# Patient Record
Sex: MALE | Race: BLACK OR AFRICAN AMERICAN | NOT HISPANIC OR LATINO | URBAN - METROPOLITAN AREA
[De-identification: names, ages, dates, MRNs, and addresses within clinical notes are randomized per-mention and may not be internally consistent; named-entity substitution may affect disease eponyms.]

---

## 2018-11-01 ENCOUNTER — EMERGENCY (EMERGENCY)
Facility: HOSPITAL | Age: 48
LOS: 1 days | Discharge: HOME | End: 2018-11-01
Attending: EMERGENCY MEDICINE | Admitting: EMERGENCY MEDICINE
Payer: COMMERCIAL

## 2018-11-01 VITALS
SYSTOLIC BLOOD PRESSURE: 159 MMHG | TEMPERATURE: 97 F | DIASTOLIC BLOOD PRESSURE: 82 MMHG | RESPIRATION RATE: 17 BRPM | OXYGEN SATURATION: 100 % | HEART RATE: 79 BPM

## 2018-11-01 VITALS
RESPIRATION RATE: 16 BRPM | DIASTOLIC BLOOD PRESSURE: 90 MMHG | TEMPERATURE: 99 F | HEART RATE: 56 BPM | OXYGEN SATURATION: 98 % | SYSTOLIC BLOOD PRESSURE: 168 MMHG

## 2018-11-01 DIAGNOSIS — Z98.890 OTHER SPECIFIED POSTPROCEDURAL STATES: Chronic | ICD-10-CM

## 2018-11-01 LAB
ALBUMIN SERPL ELPH-MCNC: 4.5 G/DL — SIGNIFICANT CHANGE UP (ref 3.3–5)
ALP SERPL-CCNC: 65 U/L — SIGNIFICANT CHANGE UP (ref 40–120)
ALT FLD-CCNC: 21 U/L — SIGNIFICANT CHANGE UP (ref 4–41)
APTT BLD: 30.8 SEC — SIGNIFICANT CHANGE UP (ref 27.5–36.3)
AST SERPL-CCNC: 28 U/L — SIGNIFICANT CHANGE UP (ref 4–40)
BASOPHILS # BLD AUTO: 0.06 K/UL — SIGNIFICANT CHANGE UP (ref 0–0.2)
BASOPHILS NFR BLD AUTO: 0.5 % — SIGNIFICANT CHANGE UP (ref 0–2)
BILIRUB SERPL-MCNC: 0.3 MG/DL — SIGNIFICANT CHANGE UP (ref 0.2–1.2)
BLD GP AB SCN SERPL QL: NEGATIVE — SIGNIFICANT CHANGE UP
BUN SERPL-MCNC: 16 MG/DL — SIGNIFICANT CHANGE UP (ref 7–23)
CALCIUM SERPL-MCNC: 9.7 MG/DL — SIGNIFICANT CHANGE UP (ref 8.4–10.5)
CHLORIDE SERPL-SCNC: 98 MMOL/L — SIGNIFICANT CHANGE UP (ref 98–107)
CK MB BLD-MCNC: 1.2 — SIGNIFICANT CHANGE UP (ref 0–2.5)
CK MB BLD-MCNC: 3.23 NG/ML — SIGNIFICANT CHANGE UP (ref 1–6.6)
CK SERPL-CCNC: 264 U/L — HIGH (ref 30–200)
CO2 SERPL-SCNC: 28 MMOL/L — SIGNIFICANT CHANGE UP (ref 22–31)
CREAT SERPL-MCNC: 1.35 MG/DL — HIGH (ref 0.5–1.3)
EOSINOPHIL # BLD AUTO: 0.18 K/UL — SIGNIFICANT CHANGE UP (ref 0–0.5)
EOSINOPHIL NFR BLD AUTO: 1.5 % — SIGNIFICANT CHANGE UP (ref 0–6)
GLUCOSE SERPL-MCNC: 140 MG/DL — HIGH (ref 70–99)
HCT VFR BLD CALC: 44.6 % — SIGNIFICANT CHANGE UP (ref 39–50)
HGB BLD-MCNC: 14.2 G/DL — SIGNIFICANT CHANGE UP (ref 13–17)
IMM GRANULOCYTES # BLD AUTO: 0.03 # — SIGNIFICANT CHANGE UP
IMM GRANULOCYTES NFR BLD AUTO: 0.3 % — SIGNIFICANT CHANGE UP (ref 0–1.5)
INR BLD: 0.97 — SIGNIFICANT CHANGE UP (ref 0.88–1.17)
LYMPHOCYTES # BLD AUTO: 3.93 K/UL — HIGH (ref 1–3.3)
LYMPHOCYTES # BLD AUTO: 33.2 % — SIGNIFICANT CHANGE UP (ref 13–44)
MAGNESIUM SERPL-MCNC: 1.9 MG/DL — SIGNIFICANT CHANGE UP (ref 1.6–2.6)
MCHC RBC-ENTMCNC: 27.5 PG — SIGNIFICANT CHANGE UP (ref 27–34)
MCHC RBC-ENTMCNC: 31.8 % — LOW (ref 32–36)
MCV RBC AUTO: 86.3 FL — SIGNIFICANT CHANGE UP (ref 80–100)
MONOCYTES # BLD AUTO: 0.73 K/UL — SIGNIFICANT CHANGE UP (ref 0–0.9)
MONOCYTES NFR BLD AUTO: 6.2 % — SIGNIFICANT CHANGE UP (ref 2–14)
NEUTROPHILS # BLD AUTO: 6.89 K/UL — SIGNIFICANT CHANGE UP (ref 1.8–7.4)
NEUTROPHILS NFR BLD AUTO: 58.3 % — SIGNIFICANT CHANGE UP (ref 43–77)
NRBC # FLD: 0 — SIGNIFICANT CHANGE UP
NT-PROBNP SERPL-SCNC: 41.76 PG/ML — SIGNIFICANT CHANGE UP
PHOSPHATE SERPL-MCNC: 3.6 MG/DL — SIGNIFICANT CHANGE UP (ref 2.5–4.5)
PLATELET # BLD AUTO: 210 K/UL — SIGNIFICANT CHANGE UP (ref 150–400)
PMV BLD: 11.1 FL — SIGNIFICANT CHANGE UP (ref 7–13)
POTASSIUM SERPL-MCNC: 4.3 MMOL/L — SIGNIFICANT CHANGE UP (ref 3.5–5.3)
POTASSIUM SERPL-SCNC: 4.3 MMOL/L — SIGNIFICANT CHANGE UP (ref 3.5–5.3)
PROT SERPL-MCNC: 8 G/DL — SIGNIFICANT CHANGE UP (ref 6–8.3)
PROTHROM AB SERPL-ACNC: 11 SEC — SIGNIFICANT CHANGE UP (ref 9.8–13.1)
RBC # BLD: 5.17 M/UL — SIGNIFICANT CHANGE UP (ref 4.2–5.8)
RBC # FLD: 13.7 % — SIGNIFICANT CHANGE UP (ref 10.3–14.5)
RH IG SCN BLD-IMP: POSITIVE — SIGNIFICANT CHANGE UP
SODIUM SERPL-SCNC: 141 MMOL/L — SIGNIFICANT CHANGE UP (ref 135–145)
TROPONIN T, HIGH SENSITIVITY: 10 NG/L — SIGNIFICANT CHANGE UP (ref ?–14)
TROPONIN T, HIGH SENSITIVITY: 10 NG/L — SIGNIFICANT CHANGE UP (ref ?–14)
TSH SERPL-MCNC: 1.35 UIU/ML — SIGNIFICANT CHANGE UP (ref 0.27–4.2)
WBC # BLD: 11.82 K/UL — HIGH (ref 3.8–10.5)
WBC # FLD AUTO: 11.82 K/UL — HIGH (ref 3.8–10.5)

## 2018-11-01 PROCEDURE — 99220: CPT

## 2018-11-01 PROCEDURE — 71045 X-RAY EXAM CHEST 1 VIEW: CPT | Mod: 26

## 2018-11-01 PROCEDURE — 93306 TTE W/DOPPLER COMPLETE: CPT | Mod: 26

## 2018-11-01 RX ORDER — ASPIRIN/CALCIUM CARB/MAGNESIUM 324 MG
324 TABLET ORAL ONCE
Qty: 0 | Refills: 0 | Status: COMPLETED | OUTPATIENT
Start: 2018-11-01 | End: 2018-11-01

## 2018-11-01 RX ORDER — LIDOCAINE 4 G/100G
10 CREAM TOPICAL ONCE
Qty: 0 | Refills: 0 | Status: COMPLETED | OUTPATIENT
Start: 2018-11-01 | End: 2018-11-01

## 2018-11-01 RX ORDER — FAMOTIDINE 10 MG/ML
20 INJECTION INTRAVENOUS ONCE
Qty: 0 | Refills: 0 | Status: COMPLETED | OUTPATIENT
Start: 2018-11-01 | End: 2018-11-01

## 2018-11-01 RX ADMIN — FAMOTIDINE 20 MILLIGRAM(S): 10 INJECTION INTRAVENOUS at 12:28

## 2018-11-01 RX ADMIN — Medication 324 MILLIGRAM(S): at 04:48

## 2018-11-01 RX ADMIN — LIDOCAINE 10 MILLILITER(S): 4 CREAM TOPICAL at 03:59

## 2018-11-01 NOTE — ED CDU PROVIDER INITIAL DAY NOTE - MEDICAL DECISION MAKING DETAILS
pt with incidental finding of STEMI on EKG, with clinical picture of no cp, dyspnea, diaphoresis, n/v. pt pending echo to r/o LV aneurysm.
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
## 2018-11-01 NOTE — ED ADULT NURSE REASSESSMENT NOTE - NS ED NURSE REASSESS COMMENT FT1
Patient sleeping, appears comfortable and in no apparent distress.  Denies chest pain, SOB or dizziness at this time.  Vitals taken and will continue to monitor patient.
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
## 2018-11-01 NOTE — ED PROVIDER NOTE - CARE PLAN
Principal Discharge DX:	Throat pain  Assessment and plan of treatment:	The patient was given verbal and written discharge instructions. Specifically, instructions when to return to the ED and when to seek follow-up from their pcp was discussed. Any specialty follow-up was discussed, including how to make an appointment.  Instructions were discussed in simple, plain language and was understood by the patient. The patient understands that should their symptoms worsen or any new symptoms arise, they should return to the ED immediately for further evaluation. Principal Discharge DX:	ST elevation myocardial infarction (STEMI), unspecified artery  Assessment and plan of treatment:	The patient was given verbal and written discharge instructions. Specifically, instructions when to return to the ED and when to seek follow-up from their pcp was discussed. Any specialty follow-up was discussed, including how to make an appointment.  Instructions were discussed in simple, plain language and was understood by the patient. The patient understands that should their symptoms worsen or any new symptoms arise, they should return to the ED immediately for further evaluation.
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
## 2018-11-01 NOTE — ED ADULT NURSE NOTE - OBJECTIVE STATEMENT
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

## 2018-11-01 NOTE — ED CDU PROVIDER INITIAL DAY NOTE - PROGRESS NOTE DETAILS
DEISY Curiel: pt resting comfortably in bed NAD, no episodes of chest pain while in the CDU.  Pt seen and cleared by cardiology Dr Kirkpatrick. Pt feels well denies any complaints ambulating without difficulty.  Results reviewed with patient.  Discharge reviewed and discussed with patient.

## 2018-11-01 NOTE — ED ADULT NURSE NOTE - NSIMPLEMENTINTERV_GEN_ALL_ED
Implemented All Universal Safety Interventions:  Raphine to call system. Call bell, personal items and telephone within reach. Instruct patient to call for assistance. Room bathroom lighting operational. Non-slip footwear when patient is off stretcher. Physically safe environment: no spills, clutter or unnecessary equipment. Stretcher in lowest position, wheels locked, appropriate side rails in place.
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
## 2018-11-01 NOTE — ED PROVIDER NOTE - ENMT, MLM
Airway patent, Nasal mucosa clear. Mouth with normal mucosa. Throat has no vesicles, no oropharyngeal exudates and uvula is midline. cshaped abrasion to superior posterior palate with surrounding mild ulcer
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
## 2018-11-01 NOTE — ED CDU PROVIDER INITIAL DAY NOTE - FAMILY HISTORY
No pertinent family history in first degree relatives Mother  Still living? Unknown  Family history of diabetes mellitus (DM), Age at diagnosis: Age Unknown

## 2018-11-01 NOTE — ED PROVIDER NOTE - PHYSICAL EXAMINATION
Oumou att: nad, aaox3, throat roof of mouth cut and 1 ulcer, ctabl, rrr, s1s2, abd soft ntnd, neg le edema

## 2018-11-01 NOTE — ED PROVIDER NOTE - OBJECTIVE STATEMENT
48yM hx htn, hld, dm p/w possible inhalation injury. Pt was cleaning the [facility] pharmacy with a solution that smelled like vinegar when he started to feel burning in the back of his throat followed by sensation 48yM hx htn, hld, dm p/w possible inhalation injury. Pt was cleaning the [employer] pharmacy with a solution that smelled like vinegar when he started to feel burning in the back of his throat followed by sensation of sob and lightheadedness. Symptoms resolved as soon as pt left room. He is now assymptomatic asid for mild burning in back of throat. 48yM hx htn, hld, dm p/w possible inhalation injury. Pt was cleaning the [employer] pharmacy with a solution that smelled like vinegar when he started to feel burning in the back of his throat followed by sensation of sob and lightheadedness. Symptoms resolved as soon as pt left room. He is now assymptomatic asid for mild burning in back of throat.  03:50 [place] att: 48M h/o htn, hld, pre-dm c/o dyspnea s/p workplace inhalation. This evening patient an [employer] employee was cleaning out "[employer]s pharmacy when he smelt a vinegar like scent and felt sudden dyspnea that resolved when he removed himself from the area. Denies cp at any time. Denies dyspnea at present. Mild sore throat without fever or chillls. Patient has no h/o asthma, copd, or smoking. Patient's supervisor in ER reports patient was cleaning with 0.5% hydrogen peroxide.

## 2020-04-26 ENCOUNTER — MESSAGE (OUTPATIENT)
Age: 50
End: 2020-04-26

## 2020-05-06 LAB
SARS-COV-2 IGG SERPL IA-ACNC: 11.3 RATIO
SARS-COV-2 IGG SERPL QL IA: POSITIVE

## 2023-03-03 ENCOUNTER — TRANSCRIPTION ENCOUNTER (OUTPATIENT)
Age: 53
End: 2023-03-03

## 2023-03-03 ENCOUNTER — INPATIENT (INPATIENT)
Facility: HOSPITAL | Age: 53
LOS: 2 days | Discharge: HOME | End: 2023-03-06
Attending: INTERNAL MEDICINE | Admitting: INTERNAL MEDICINE
Payer: COMMERCIAL

## 2023-03-03 VITALS
DIASTOLIC BLOOD PRESSURE: 114 MMHG | SYSTOLIC BLOOD PRESSURE: 201 MMHG | RESPIRATION RATE: 20 BRPM | HEART RATE: 70 BPM | TEMPERATURE: 98 F | OXYGEN SATURATION: 100 %

## 2023-03-03 DIAGNOSIS — R07.9 CHEST PAIN, UNSPECIFIED: ICD-10-CM

## 2023-03-03 DIAGNOSIS — I20.9 ANGINA PECTORIS, UNSPECIFIED: ICD-10-CM

## 2023-03-03 DIAGNOSIS — Z98.890 OTHER SPECIFIED POSTPROCEDURAL STATES: Chronic | ICD-10-CM

## 2023-03-03 DIAGNOSIS — I10 ESSENTIAL (PRIMARY) HYPERTENSION: ICD-10-CM

## 2023-03-03 DIAGNOSIS — I25.10 ATHEROSCLEROTIC HEART DISEASE OF NATIVE CORONARY ARTERY WITHOUT ANGINA PECTORIS: ICD-10-CM

## 2023-03-03 DIAGNOSIS — E11.9 TYPE 2 DIABETES MELLITUS WITHOUT COMPLICATIONS: ICD-10-CM

## 2023-03-03 DIAGNOSIS — E87.20 ACIDOSIS, UNSPECIFIED: ICD-10-CM

## 2023-03-03 DIAGNOSIS — E78.5 HYPERLIPIDEMIA, UNSPECIFIED: ICD-10-CM

## 2023-03-03 DIAGNOSIS — R63.8 OTHER SYMPTOMS AND SIGNS CONCERNING FOOD AND FLUID INTAKE: ICD-10-CM

## 2023-03-03 LAB
A1C WITH ESTIMATED AVERAGE GLUCOSE RESULT: 11 % — HIGH (ref 4–5.6)
ALBUMIN SERPL ELPH-MCNC: 4.2 G/DL — SIGNIFICANT CHANGE UP (ref 3.3–5)
ALP SERPL-CCNC: 85 U/L — SIGNIFICANT CHANGE UP (ref 40–120)
ALT FLD-CCNC: 39 U/L — SIGNIFICANT CHANGE UP (ref 4–41)
ANION GAP SERPL CALC-SCNC: 12 MMOL/L — SIGNIFICANT CHANGE UP (ref 7–14)
ANISOCYTOSIS BLD QL: SLIGHT — SIGNIFICANT CHANGE UP
APTT BLD: 30.6 SEC — SIGNIFICANT CHANGE UP (ref 27–36.3)
AST SERPL-CCNC: 24 U/L — SIGNIFICANT CHANGE UP (ref 4–40)
BASE EXCESS BLDV CALC-SCNC: 0.7 MMOL/L — SIGNIFICANT CHANGE UP (ref -2–3)
BASOPHILS # BLD AUTO: 0.09 K/UL — SIGNIFICANT CHANGE UP (ref 0–0.2)
BASOPHILS NFR BLD AUTO: 0.9 % — SIGNIFICANT CHANGE UP (ref 0–2)
BILIRUB SERPL-MCNC: 0.3 MG/DL — SIGNIFICANT CHANGE UP (ref 0.2–1.2)
BLOOD GAS VENOUS COMPREHENSIVE RESULT: SIGNIFICANT CHANGE UP
BUN SERPL-MCNC: 10 MG/DL — SIGNIFICANT CHANGE UP (ref 7–23)
CALCIUM SERPL-MCNC: 9.1 MG/DL — SIGNIFICANT CHANGE UP (ref 8.4–10.5)
CHLORIDE BLDV-SCNC: 100 MMOL/L — SIGNIFICANT CHANGE UP (ref 96–108)
CHLORIDE SERPL-SCNC: 102 MMOL/L — SIGNIFICANT CHANGE UP (ref 98–107)
CHOLEST SERPL-MCNC: 162 MG/DL — SIGNIFICANT CHANGE UP
CK MB CFR SERPL CALC: 1.6 NG/ML — SIGNIFICANT CHANGE UP
CO2 BLDV-SCNC: 29.1 MMOL/L — HIGH (ref 22–26)
CO2 SERPL-SCNC: 25 MMOL/L — SIGNIFICANT CHANGE UP (ref 22–31)
CREAT SERPL-MCNC: 0.96 MG/DL — SIGNIFICANT CHANGE UP (ref 0.5–1.3)
EGFR: 95 ML/MIN/1.73M2 — SIGNIFICANT CHANGE UP
EOSINOPHIL # BLD AUTO: 0.09 K/UL — SIGNIFICANT CHANGE UP (ref 0–0.5)
EOSINOPHIL NFR BLD AUTO: 0.9 % — SIGNIFICANT CHANGE UP (ref 0–6)
ESTIMATED AVERAGE GLUCOSE: 269 — SIGNIFICANT CHANGE UP
FLUAV AG NPH QL: SIGNIFICANT CHANGE UP
FLUBV AG NPH QL: SIGNIFICANT CHANGE UP
GAS PNL BLDV: 134 MMOL/L — LOW (ref 136–145)
GIANT PLATELETS BLD QL SMEAR: PRESENT — SIGNIFICANT CHANGE UP
GLUCOSE BLDC GLUCOMTR-MCNC: 170 MG/DL — HIGH (ref 70–99)
GLUCOSE BLDC GLUCOMTR-MCNC: 176 MG/DL — HIGH (ref 70–99)
GLUCOSE BLDC GLUCOMTR-MCNC: 176 MG/DL — HIGH (ref 70–99)
GLUCOSE BLDC GLUCOMTR-MCNC: 181 MG/DL — HIGH (ref 70–99)
GLUCOSE BLDC GLUCOMTR-MCNC: 201 MG/DL — HIGH (ref 70–99)
GLUCOSE BLDV-MCNC: 232 MG/DL — HIGH (ref 70–99)
GLUCOSE SERPL-MCNC: 224 MG/DL — HIGH (ref 70–99)
HCO3 BLDV-SCNC: 28 MMOL/L — SIGNIFICANT CHANGE UP (ref 22–29)
HCT VFR BLD CALC: 47.6 % — SIGNIFICANT CHANGE UP (ref 39–50)
HCT VFR BLDA CALC: 47 % — SIGNIFICANT CHANGE UP (ref 39–51)
HDLC SERPL-MCNC: 37 MG/DL — LOW
HGB BLD CALC-MCNC: 15.8 G/DL — SIGNIFICANT CHANGE UP (ref 12.6–17.4)
HGB BLD-MCNC: 14.8 G/DL — SIGNIFICANT CHANGE UP (ref 13–17)
IANC: 3.85 K/UL — SIGNIFICANT CHANGE UP (ref 1.8–7.4)
INR BLD: 1.08 RATIO — SIGNIFICANT CHANGE UP (ref 0.88–1.16)
LACTATE BLDV-MCNC: 2.1 MMOL/L — HIGH (ref 0.5–2)
LIPID PNL WITH DIRECT LDL SERPL: 100 MG/DL — HIGH
LYMPHOCYTES # BLD AUTO: 2.53 K/UL — SIGNIFICANT CHANGE UP (ref 1–3.3)
LYMPHOCYTES # BLD AUTO: 24.1 % — SIGNIFICANT CHANGE UP (ref 13–44)
MANUAL SMEAR VERIFICATION: SIGNIFICANT CHANGE UP
MCHC RBC-ENTMCNC: 26 PG — LOW (ref 27–34)
MCHC RBC-ENTMCNC: 31.1 GM/DL — LOW (ref 32–36)
MCV RBC AUTO: 83.7 FL — SIGNIFICANT CHANGE UP (ref 80–100)
MONOCYTES # BLD AUTO: 0.93 K/UL — HIGH (ref 0–0.9)
MONOCYTES NFR BLD AUTO: 8.9 % — SIGNIFICANT CHANGE UP (ref 2–14)
NEUTROPHILS # BLD AUTO: 4.12 K/UL — SIGNIFICANT CHANGE UP (ref 1.8–7.4)
NEUTROPHILS NFR BLD AUTO: 37.5 % — LOW (ref 43–77)
NEUTS BAND # BLD: 1.8 % — SIGNIFICANT CHANGE UP (ref 0–6)
NON HDL CHOLESTEROL: 125 MG/DL — SIGNIFICANT CHANGE UP
NRBC # BLD: 1 /100 — HIGH (ref 0–0)
NT-PROBNP SERPL-SCNC: 54 PG/ML — SIGNIFICANT CHANGE UP
PCO2 BLDV: 51 MMHG — SIGNIFICANT CHANGE UP (ref 42–55)
PH BLDV: 7.34 — SIGNIFICANT CHANGE UP (ref 7.32–7.43)
PLAT MORPH BLD: NORMAL — SIGNIFICANT CHANGE UP
PLATELET # BLD AUTO: 218 K/UL — SIGNIFICANT CHANGE UP (ref 150–400)
PLATELET COUNT - ESTIMATE: NORMAL — SIGNIFICANT CHANGE UP
PO2 BLDV: 36 MMHG — SIGNIFICANT CHANGE UP (ref 25–45)
POIKILOCYTOSIS BLD QL AUTO: SLIGHT — SIGNIFICANT CHANGE UP
POLYCHROMASIA BLD QL SMEAR: SLIGHT — SIGNIFICANT CHANGE UP
POTASSIUM BLDV-SCNC: 4.7 MMOL/L — SIGNIFICANT CHANGE UP (ref 3.5–5.1)
POTASSIUM SERPL-MCNC: 3.4 MMOL/L — LOW (ref 3.5–5.3)
POTASSIUM SERPL-SCNC: 3.4 MMOL/L — LOW (ref 3.5–5.3)
PROT SERPL-MCNC: 7.5 G/DL — SIGNIFICANT CHANGE UP (ref 6–8.3)
PROTHROM AB SERPL-ACNC: 12.5 SEC — SIGNIFICANT CHANGE UP (ref 10.5–13.4)
RBC # BLD: 5.69 M/UL — SIGNIFICANT CHANGE UP (ref 4.2–5.8)
RBC # FLD: 13.2 % — SIGNIFICANT CHANGE UP (ref 10.3–14.5)
RBC BLD AUTO: ABNORMAL
RSV RNA NPH QL NAA+NON-PROBE: SIGNIFICANT CHANGE UP
SAO2 % BLDV: 61.3 % — LOW (ref 67–88)
SARS-COV-2 RNA SPEC QL NAA+PROBE: SIGNIFICANT CHANGE UP
SMUDGE CELLS # BLD: PRESENT — SIGNIFICANT CHANGE UP
SODIUM SERPL-SCNC: 139 MMOL/L — SIGNIFICANT CHANGE UP (ref 135–145)
TRIGL SERPL-MCNC: 125 MG/DL — SIGNIFICANT CHANGE UP
TROPONIN T, HIGH SENSITIVITY RESULT: 14 NG/L — SIGNIFICANT CHANGE UP
TROPONIN T, HIGH SENSITIVITY RESULT: 17 NG/L — SIGNIFICANT CHANGE UP
TSH SERPL-MCNC: 1.99 UIU/ML — SIGNIFICANT CHANGE UP (ref 0.27–4.2)
VARIANT LYMPHS # BLD: 25.9 % — HIGH (ref 0–6)
WBC # BLD: 10.48 K/UL — SIGNIFICANT CHANGE UP (ref 3.8–10.5)
WBC # FLD AUTO: 10.48 K/UL — SIGNIFICANT CHANGE UP (ref 3.8–10.5)

## 2023-03-03 PROCEDURE — 93018 CV STRESS TEST I&R ONLY: CPT | Mod: GC

## 2023-03-03 PROCEDURE — 93306 TTE W/DOPPLER COMPLETE: CPT | Mod: 26

## 2023-03-03 PROCEDURE — 99223 1ST HOSP IP/OBS HIGH 75: CPT

## 2023-03-03 PROCEDURE — 99254 IP/OBS CNSLTJ NEW/EST MOD 60: CPT

## 2023-03-03 PROCEDURE — 99285 EMERGENCY DEPT VISIT HI MDM: CPT

## 2023-03-03 PROCEDURE — 78452 HT MUSCLE IMAGE SPECT MULT: CPT | Mod: 26

## 2023-03-03 PROCEDURE — 93016 CV STRESS TEST SUPVJ ONLY: CPT | Mod: GC

## 2023-03-03 PROCEDURE — 71045 X-RAY EXAM CHEST 1 VIEW: CPT | Mod: 26

## 2023-03-03 RX ORDER — GLUCAGON INJECTION, SOLUTION 0.5 MG/.1ML
1 INJECTION, SOLUTION SUBCUTANEOUS ONCE
Refills: 0 | Status: DISCONTINUED | OUTPATIENT
Start: 2023-03-03 | End: 2023-03-06

## 2023-03-03 RX ORDER — DEXTROSE 50 % IN WATER 50 %
25 SYRINGE (ML) INTRAVENOUS ONCE
Refills: 0 | Status: DISCONTINUED | OUTPATIENT
Start: 2023-03-03 | End: 2023-03-06

## 2023-03-03 RX ORDER — NIFEDIPINE 30 MG
60 TABLET, EXTENDED RELEASE 24 HR ORAL DAILY
Refills: 0 | Status: DISCONTINUED | OUTPATIENT
Start: 2023-03-03 | End: 2023-03-06

## 2023-03-03 RX ORDER — DEXTROSE 50 % IN WATER 50 %
15 SYRINGE (ML) INTRAVENOUS ONCE
Refills: 0 | Status: DISCONTINUED | OUTPATIENT
Start: 2023-03-03 | End: 2023-03-06

## 2023-03-03 RX ORDER — LANOLIN ALCOHOL/MO/W.PET/CERES
3 CREAM (GRAM) TOPICAL AT BEDTIME
Refills: 0 | Status: DISCONTINUED | OUTPATIENT
Start: 2023-03-03 | End: 2023-03-06

## 2023-03-03 RX ORDER — POTASSIUM CHLORIDE 20 MEQ
40 PACKET (EA) ORAL ONCE
Refills: 0 | Status: COMPLETED | OUTPATIENT
Start: 2023-03-03 | End: 2023-03-03

## 2023-03-03 RX ORDER — LABETALOL HCL 100 MG
10 TABLET ORAL ONCE
Refills: 0 | Status: COMPLETED | OUTPATIENT
Start: 2023-03-03 | End: 2023-03-03

## 2023-03-03 RX ORDER — INSULIN LISPRO 100/ML
VIAL (ML) SUBCUTANEOUS AT BEDTIME
Refills: 0 | Status: DISCONTINUED | OUTPATIENT
Start: 2023-03-03 | End: 2023-03-04

## 2023-03-03 RX ORDER — ENOXAPARIN SODIUM 100 MG/ML
40 INJECTION SUBCUTANEOUS EVERY 24 HOURS
Refills: 0 | Status: DISCONTINUED | OUTPATIENT
Start: 2023-03-03 | End: 2023-03-06

## 2023-03-03 RX ORDER — ASPIRIN/CALCIUM CARB/MAGNESIUM 324 MG
324 TABLET ORAL ONCE
Refills: 0 | Status: COMPLETED | OUTPATIENT
Start: 2023-03-03 | End: 2023-03-03

## 2023-03-03 RX ORDER — DEXTROSE 50 % IN WATER 50 %
12.5 SYRINGE (ML) INTRAVENOUS ONCE
Refills: 0 | Status: DISCONTINUED | OUTPATIENT
Start: 2023-03-03 | End: 2023-03-06

## 2023-03-03 RX ORDER — SODIUM CHLORIDE 9 MG/ML
1000 INJECTION, SOLUTION INTRAVENOUS
Refills: 0 | Status: DISCONTINUED | OUTPATIENT
Start: 2023-03-03 | End: 2023-03-06

## 2023-03-03 RX ORDER — ONDANSETRON 8 MG/1
4 TABLET, FILM COATED ORAL EVERY 8 HOURS
Refills: 0 | Status: DISCONTINUED | OUTPATIENT
Start: 2023-03-03 | End: 2023-03-06

## 2023-03-03 RX ORDER — LISINOPRIL 2.5 MG/1
20 TABLET ORAL DAILY
Refills: 0 | Status: DISCONTINUED | OUTPATIENT
Start: 2023-03-03 | End: 2023-03-06

## 2023-03-03 RX ORDER — ATORVASTATIN CALCIUM 80 MG/1
20 TABLET, FILM COATED ORAL AT BEDTIME
Refills: 0 | Status: DISCONTINUED | OUTPATIENT
Start: 2023-03-03 | End: 2023-03-06

## 2023-03-03 RX ORDER — ACETAMINOPHEN 500 MG
650 TABLET ORAL EVERY 6 HOURS
Refills: 0 | Status: DISCONTINUED | OUTPATIENT
Start: 2023-03-03 | End: 2023-03-06

## 2023-03-03 RX ORDER — INSULIN LISPRO 100/ML
VIAL (ML) SUBCUTANEOUS
Refills: 0 | Status: DISCONTINUED | OUTPATIENT
Start: 2023-03-03 | End: 2023-03-04

## 2023-03-03 RX ORDER — INSULIN GLARGINE 100 [IU]/ML
12 INJECTION, SOLUTION SUBCUTANEOUS AT BEDTIME
Refills: 0 | Status: DISCONTINUED | OUTPATIENT
Start: 2023-03-03 | End: 2023-03-04

## 2023-03-03 RX ORDER — ASPIRIN/CALCIUM CARB/MAGNESIUM 324 MG
81 TABLET ORAL DAILY
Refills: 0 | Status: DISCONTINUED | OUTPATIENT
Start: 2023-03-04 | End: 2023-03-06

## 2023-03-03 RX ADMIN — LISINOPRIL 20 MILLIGRAM(S): 2.5 TABLET ORAL at 15:07

## 2023-03-03 RX ADMIN — ATORVASTATIN CALCIUM 20 MILLIGRAM(S): 80 TABLET, FILM COATED ORAL at 22:52

## 2023-03-03 RX ADMIN — Medication 10 MILLIGRAM(S): at 08:30

## 2023-03-03 RX ADMIN — Medication 3 MILLIGRAM(S): at 22:52

## 2023-03-03 RX ADMIN — ENOXAPARIN SODIUM 40 MILLIGRAM(S): 100 INJECTION SUBCUTANEOUS at 17:24

## 2023-03-03 RX ADMIN — INSULIN GLARGINE 12 UNIT(S): 100 INJECTION, SOLUTION SUBCUTANEOUS at 22:52

## 2023-03-03 RX ADMIN — Medication 324 MILLIGRAM(S): at 01:16

## 2023-03-03 RX ADMIN — Medication 40 MILLIEQUIVALENT(S): at 09:24

## 2023-03-03 RX ADMIN — Medication 1: at 15:09

## 2023-03-03 RX ADMIN — Medication 60 MILLIGRAM(S): at 15:07

## 2023-03-03 RX ADMIN — Medication 1: at 17:24

## 2023-03-03 NOTE — ED PROVIDER NOTE - ATTENDING CONTRIBUTION TO CARE
I performed a face-to-face evaluation of the patient and performed a history and physical examination along with the resident or ACP, and/or medical student above.  I agree with the history and physical examination as documented by the resident or ACP, and/or medical student above.  Castro:  Gen: Alert, NAD  Head: NC, AT,  EOMI, normal lids/conjunctiva  ENT:  normal hearing, patent oropharynx without erythema/exudate  Neck: +supple, no tenderness/meningismus/JVD, +Trachea midline  Chest: no chest wall tenderness, equal chest rise  Pulm: Bilateral BS, normal resp effort, no wheeze/stridor/retractions  CV: RRR, no M/R/G, +dist pulses  Abd: +BS, soft, NT/ND  Rectal: deferred  Mskel: no edema/erythema/cyanosis  Skin: no rash  Neuro: AAOx3

## 2023-03-03 NOTE — H&P ADULT - PROBLEM SELECTOR PLAN 5
- home meds: atorvastatin 20mg daily, HCTZ-lisinopril 25-20mg  - EKG stable from prior  - trop neg x2  - low suspicion for ACS at present   - c/w home meds  - management as above

## 2023-03-03 NOTE — DISCHARGE NOTE PROVIDER - NSDCCPTREATMENT_GEN_ALL_CORE_FT
PRINCIPAL PROCEDURE  Procedure: 2D echocardiography  Findings and Treatment: Ejection Fraction (Modified Love Rule): 64 %  ------------------------------------------------------------------------  OBSERVATIONS:  Mitral Valve: Normal mitral valve. Minimal mitral  regurgitation.  Aortic Root: Normal aortic root.  Aortic Valve: Normal trileaflet aortic valve.  Left Atrium: Normal left atrium.  LA volume index = 18  cc/m2.  Left Ventricle: Normal left ventricular systolic function.  No segmental wall motion abnormalities. Mild concentric  left ventricular hypertrophy.  Right Heart: Normal right atrium. Normal right ventricular  size and function.Normal tricuspid valve. Minimal  tricuspid regurgitation. Normal pulmonic valve. Mild  pulmonic regurgitation.  Pericardium/PleuraNormal pericardium with no pericardial  effusion.  ------------------------------------------------------------------------  CONCLUSIONS:  1. Normal mitral valve. Minimal mitral regurgitation.  2. Mild concentric left ventricular hypertrophy.  3. Normal left ventricular systolic function. No segmental  wall motion abnormalities.  4. Normal right ventricular size and function.  *** Compared with echocardiogram of 11/1/2018, no  significant changes noted.

## 2023-03-03 NOTE — PROVIDER CONTACT NOTE (OTHER) - BACKGROUND
All lab work received for review in BLUE folder.    Patient admitted Patient admitted for chest pain/ non-complaint with home medications

## 2023-03-03 NOTE — ED ADULT NURSE REASSESSMENT NOTE - NS ED NURSE REASSESS COMMENT FT1
Pt was received from night nurse at handoff. Pt is AxO 4. pt denies active chest pain and is normal sinus on the tele monitor. Pt respirations are even and unlabored. Pulses equal bilaterally in all extremities. Abdomen is soft, nontender, and nondistended. Pt was given prescribed dose of labetalol for elevated BP, as prescribed by MD. Pt was however not able to effectively swallow the 2 potassium tablets prescribed. Will notify provider. Pt denies SOB, abdominal pain, headache, N/V/D, or dizziness at this time. Will continue to monitor.

## 2023-03-03 NOTE — DISCHARGE NOTE PROVIDER - NSDCMRMEDTOKEN_GEN_ALL_CORE_FT
atorvastatin 20 mg oral tablet: 1 tab(s) orally once a day  Jardiance 25 mg oral tablet: 1 tab(s) orally once a day (in the morning)  lisinopril-hydrochlorothiazide 20 mg-25 mg oral tablet: 1 tab(s) orally once a day  metFORMIN 1000 mg oral tablet, extended release: 1 tab(s) orally once a day  NIFEdipine 60 mg oral tablet, extended release: 1 tab(s) orally once a day   atorvastatin 20 mg oral tablet: 1 tab(s) orally once a day  Jardiance 25 mg oral tablet: 1 tab(s) orally once a day (in the morning)  Lanmarybel Solostar Pen 100 units/mL subcutaneous solution: 18 unit(s) subcutaneous once a day (at bedtime)    price check [id number]   lisinopril-hydrochlorothiazide 20 mg-25 mg oral tablet: 1 tab(s) orally once a day  metFORMIN 1000 mg oral tablet, extended release: 1 tab(s) orally once a day  NIFEdipine 60 mg oral tablet, extended release: 1 tab(s) orally once a day   alcohol swabs : Apply topically to affected area 4 times a day   atorvastatin 20 mg oral tablet: 1 tab(s) orally once a day  glucometer (per patient&#x27;s insurance): Test blood sugars four times a day. Dispense #1 glucometer.  glucose tablets: Follow instructions on bottle when sugar is low.  Insulin Pen Needles, 4mm: 1 application subcutaneously once a day . ** Use with insulin pen **   Jardiance 25 mg oral tablet: 1 tab(s) orally once a day (in the morning)  lancets: 1 application subcutaneously 4 times a day   Lantus Solostar Pen 100 units/mL subcutaneous solution: 15 unit(s) subcutaneous once a day (at bedtime)   lisinopril-hydrochlorothiazide 20 mg-25 mg oral tablet: 1 tab(s) orally once a day  metFORMIN 1000 mg oral tablet: 1 tab(s) orally 2 times a day   NIFEdipine 60 mg oral tablet, extended release: 1 tab(s) orally once a day  test strips (per patient&#x27;s insurance): 1 application subcutaneously 4 times a day. ** Compatible with patient&#x27;s glucometer **

## 2023-03-03 NOTE — H&P ADULT - PROBLEM SELECTOR PLAN 7
- F: none  - E: replete K<4, Mg<2  - N: DASH/TLC, carb consistent  - D: lovenox 40mg q24h  - G: none    code: full  dispo: pending medical optimization, anticipate return to home

## 2023-03-03 NOTE — DISCHARGE NOTE PROVIDER - NSFOLLOWUPCLINICS_GEN_ALL_ED_FT
[facility]  Cardiology  [address] [address]  Phone: [phone]  Fax:   Follow Up Time: 1 month     [facility]  Cardiology  [address] [place] [address]  Phone: [phone]  Fax:   Follow Up Time: 1 month    [facility]  Endocrinology  [address] [place] [address]  Phone: [phone]  Fax:   Follow Up Time: 2 weeks     [facility]  Endocrinology  [address] [place] [address]  Phone: [phone]  Fax:   Follow Up Time: 2 weeks

## 2023-03-03 NOTE — CONSULT NOTE ADULT - ASSESSMENT
52Y M PMH HTN, HLD, T2DM, presents emergency department for 1 episode of left substernal chest pain associated with diaphoresis.    1. Chest pain  Patient with chest pain that is concerning for angina. EKG does not show any signs of ischemia without ST elevations or T wave inversions. Troponins are negative.   -Please obtain nuclear exercise stress test  -Start the patient on ASA 81mg daily  -Please obtain HgbA1c, lipid panel, TSH  -Please obtain TTE  -Can stop trending CE    2. HTN  Stable  Continue home meds    3. HLD  Stable  -Obtain lipid panel       Chaitanya Nieto MD  Cardiology fellow
The patient is a 53 yo M/F with PMH of HTN, HLD, Type 2 DM admitted with chest pain. Endocrinology was consulted for management of diabetes mellitus.    #Type 2 Diabetes Mellitus  - HbA1c: 11%  - home regimen: non compliant with metformin and Jardiance  - Weight: 117.4 kg     Recommendations:   - Recommend 12 units of lantus QHS  - hold home PO meds  - Recommend low dose lispro correction scale TIDQAC and QHS  - Please check FSG before meals and QHS, or q6h while NPO  - RD consult  - hypoglycemia orderset prn  - extensively discussed importance of glycemic control to prevent micro- and macrovascular complications  - please have RN complete insulin teaching for discharge     Discharge planning:  Would benefit from addition of basal insulin in addition to oral agents.   Recommend Metformin 1000 mg BID + Jardiance 25 mg daily + basal insulin (if discharged today, can recommend Lantus 12u daily).   Will need insulin teaching, supplies, glucometer, insulin Rx at discharge.     Pt should follow up with Endocrinology post discharge.    ENDOCRINE FOLLOW UP  CALL PATIENT ACCESS SERVICES  [phone]  For all [facility] Endocrine Practices phone numbers and locations throughout [facility], and [place].      If patient wishes to follow up with [facility] Endocrinology at [place]  [facility]  [address], [address], [place] [address]  [phone]   Please call to schedule appointment with CDE/Nutritionist and MD appointment next available       #Hypertension  - BP goal <130/80  - Management as per primary team    #Hyperlipidemia  Reports non compliance with statin.   - should continue on statin at discharge     Gordon Pedraza MD  Attending Physician   Department of Endocrinology, Diabetes and Metabolism   [employer] Teams for 03-03-23 @ 15:34.    If before 9AM or after 5PM, or on weekends/holidays, please call the Endocrine answering service for assistance ([phone]).  For nonurgent matters, please email [email or web address] for assistance.     Please note that a different provider may be following this patient each day.

## 2023-03-03 NOTE — H&P ADULT - NSHPLABSRESULTS_GEN_ALL_CORE
LABS:                        14.8   10.48 )-----------( 218      ( 03 Mar 2023 01:15 )             47.6     03-03    139  |  102  |  10  ----------------------------<  224<H>  3.4<L>   |  25  |  0.96    Ca    9.1      03 Mar 2023 01:15    TPro  7.5  /  Alb  4.2  /  TBili  0.3  /  DBili  x   /  AST  24  /  ALT  39  /  AlkPhos  85  03-03    PT/INR - ( 03 Mar 2023 01:15 )   PT: 12.5 sec;   INR: 1.08 ratio         PTT - ( 03 Mar 2023 01:15 )  PTT:30.6 sec    CAPILLARY BLOOD GLUCOSE      POCT Blood Glucose.: 201 mg/dL (03 Mar 2023 07:50)      RADIOLOGY & ADDITIONAL TESTS: Reviewed.

## 2023-03-03 NOTE — ED ADULT TRIAGE NOTE - CHIEF COMPLAINT QUOTE
Pt says two hours ago he started having chest pain and mild SOB, was sweating when he started having pain. PMH of HTN, DM, heart murmur, /114 in triage. Will obtain EKG

## 2023-03-03 NOTE — H&P ADULT - PROBLEM SELECTOR PLAN 2
- lactate 2.1 on arrival  - warm and well perfused on exam  - will not continue to trend  - repeat for any change to hemodynamics or exam - lactate 2.1 on arrival  - warm and well perfused on exam; not meeting SIRS criteria  - likely demand in setting of poor PO intake and lack of home meds with HTN   - will not continue to trend  - repeat for any change to hemodynamics or exam

## 2023-03-03 NOTE — CONSULT NOTE ADULT - PROBLEM/RECOMMENDATION-3
Group Topic:  Behavioral Activation Group    Date: June 20  Start Time:  7:30 PM  End Time:  8:30 PM    Focus: Goals  Number in attendance: 8    JON Munoz      Attendance: Present  Patient Response: Attentive and Good eye contact     For self-care pt plans to start playing piano again, read, and walk.  Her goals are to work on packing up and selling her toys, work on landscaping at her condo, and repairing scratches on her doors from her dogs.  She wants to avoid laying on the couch sleeping and watching tv all day, staying indoors all time, feeling sorry for self, and drinking wine. She wants to practice dealing with her physical pain, protecting herself from cruelty of others, and taking better care of herself.   DISPLAY PLAN FREE TEXT

## 2023-03-03 NOTE — H&P ADULT - PROBLEM SELECTOR PLAN 6
- home meds: jardiance 25mg daily, metformin 1g BID  - mISS while inpt  - check A1C  - carb consistent diet

## 2023-03-03 NOTE — H&P ADULT - PROBLEM SELECTOR PLAN 3
- home meds: HCTZ-lisinopril 25-20mg daily, nifedipine 60mg daily  - HTN to SBP 200s on arrival, now improved after 10mg IV labetalol  - c/w home meds - home meds: HCTZ-lisinopril 25-20mg daily, nifedipine 60mg daily  - HTN to SBP 200s on arrival, now improved after 10mg IV labetalol  - has not taken home meds in a few days, likely contributing to elevated BP  - c/w home meds

## 2023-03-03 NOTE — CONSULT NOTE ADULT - SUBJECTIVE AND OBJECTIVE BOX
Patient seen and evaluated at bedside    Chief Complaint: Chest pain    HPI:    52Y M PMH HTN, HLD, T2DM, presents emergency department for 1 episode of left substernal chest pain associated with diaphoresis.  Patient reports was in the parking lot getting ready for work when he was feeling stressed and suddenly started feeling pressure squeezing pain in his left chest.  Symptoms lasted several minutes before resolution.  Currently chest pain-free.  Had similar episode in the past for which she was admitted to the hospital with STEMI, echo was normal that time patient was not cathed at the time.  No aspirin prior to arrival.    PMHx:   HTN (hypertension)        PSHx:   No significant past surgical history    S/P foot surgery        Allergies:  No Known Allergies      Home Meds:  HTN (hypertension)        Current Medications:   labetalol Injectable 10 milliGRAM(s) IV Push Once  potassium chloride    Tablet ER 40 milliEquivalent(s) Oral once      FAMILY HISTORY:  Family history of diabetes mellitus (DM) (Mother)        Social History:  Smoking History:  Alcohol Use:  Drug Use:    REVIEW OF SYSTEMS:  Constitutional:     [ ] negative [ ] fevers [ ] chills [ ] weight loss [ ] weight gain  HEENT:                  [ ] negative [ ] dry eyes [ ] eye irritation [ ] postnasal drip [ ] nasal congestion  CV:                         [ ] negative  [ ] chest pain [ ] orthopnea [ ] palpitations [ ] murmur  Resp:                     [ ] negative [ ] cough [ ] shortness of breath [ ] dyspnea [ ] wheezing [ ] sputum [ ]hemoptysis  GI:                          [ ] negative [ ] nausea [ ] vomiting [ ] diarrhea [ ] constipation [ ] abd pain [ ] dysphagia   :                        [ ] negative [ ] dysuria [ ] nocturia [ ] hematuria [ ] increased urinary frequency  Musculoskeletal: [ ] negative [ ] back pain [ ] myalgias [ ] arthralgias [ ] fracture  Skin:                       [ ] negative [ ] rash [ ] itch  Neurological:        [ ] negative [ ] headache [ ] dizziness [ ] syncope [ ] weakness [ ] numbness  Psychiatric:           [ ] negative [ ] anxiety [ ] depression  Endocrine:            [ ] negative [ ] diabetes [ ] thyroid problem  Heme/Lymph:      [ ] negative [ ] anemia [ ] bleeding problem  Allergic/Immune: [ ] negative [ ] itchy eyes [ ] nasal discharge [ ] hives [ ] angioedema    [ ] All other systems negative  [ ] Unable to assess ROS due to      Physical Exam:  T(F): 97.9 (03-03), Max: 98 (03-03)  HR: 63 (03-03) (63 - 70)  BP: 164/89 (03-03) (164/89 - 201/114)  RR: 16 (03-03)  SpO2: 98% (03-03)  GENERAL: No acute distress, well-developed  HEAD:  Atraumatic, Normocephalic  ENT: EOMI, PERRLA, conjunctiva and sclera clear, Neck supple, No JVD, moist mucosa  CHEST/LUNG: Clear to auscultation bilaterally; No wheeze, equal breath sounds bilaterally   BACK: No spinal tenderness  HEART: Regular rate and rhythm; No murmurs, rubs, or gallops  ABDOMEN: Soft, Nontender, Nondistended; Bowel sounds present  EXTREMITIES:  No clubbing, cyanosis, or edema  PSYCH: Nl behavior, nl affect  NEUROLOGY: AAOx3, non-focal, cranial nerves intact  SKIN: Normal color, No rashes or lesions  LINES:    Cardiovascular Diagnostic Testing:    ECG: Personally reviewed: Normal sinus without ST segment elevation, LVH noted. T wave inversions laterally. Unchanged from prior.    Echo: Personally reviewed:    Patient name: JAME BOYLE  YOB: 1970   Age: 48 (M)   MR#: [id number]  Study Date: 11/1/2018  Location: O/PSonographer: Lauren Finkelstein  Study quality: Technically good  Referring Physician: Not Available Doctor, MD  Blood Pressure: 157/87 mmHg  Height: 178 cm  Weight: 127 kg  BSA: 2.4 m2  ------------------------------------------------------------------------  PROCEDURE: Transthoracic echocardiogram with 2-D, M-Mode  and complete spectral and color flow Doppler.  INDICATION: ST elevation (STEMI) myocardial infarction of  unspecified site (I21.3)  ------------------------------------------------------------------------  DIMENSIONS:  Dimensions:     Normal Values:  LA:     3.8 cm    2.0 - 4.0 cm  Ao:     3.1 cm    2.0 -3.8 cm  SEPTUM: 1.5 cm    0.6 - 1.2 cm  PWT:    1.5 cm    0.6 - 1.1 cm  LVIDd:  4.5 cm    3.0 - 5.6 cm  LVIDs:  3.0 cm    1.8 - 4.0 cm  Derived Variables:  LVMI: 114 g/m2  RWT: 0.66  Fractional short: 33 %  Ejection Fraction (Teicholtz): 62 %  ------------------------------------------------------------------------  OBSERVATIONS:  Mitral Valve: Normal mitral valve. Minimal mitral  regurgitation.  Aortic Root: Normal aortic root.  Aortic Valve: Normal trileaflet aortic valve.  Left Atrium: Normal left atrium.  LA volume index = 24  cc/m2.  Left Ventricle: Normal left ventricular systolic function.  No segmental wall motion abnormalities. Mild concentric  left ventricular hypertrophy.  Right Heart: Normal right atrium. Normal right ventricular  size and function. Normal tricuspid valve. Minimal  tricuspid regurgitation. Normal pulmonic valve. Minimal  pulmonic regurgitation.  Pericardium/PleuraNormal pericardium with no pericardial  effusion.  ------------------------------------------------------------------------  CONCLUSIONS:  1. Normal mitral valve. Minimal mitral regurgitation.  2. Mild concentric left ventricular hypertrophy.  3. Normal left ventricular systolic function. No segmental  wall motion abnormalities.  4. Normal right ventricular size and function.  ------------------------------------------------------------------------  Confirmed on  11/1/2018 - 13:16:06 by Andrea Mariano M.D.  ------------------------------------------------------------------------      Stress Testing:    Cath:    Imaging:    CXR: Personally reviewed    Labs: Personally reviewed                        14.8   10.48 )-----------( 218      ( 03 Mar 2023 01:15 )             47.6     03-03    139  |  102  |  10  ----------------------------<  224<H>  3.4<L>   |  25  |  0.96    Ca    9.1      03 Mar 2023 01:15    TPro  7.5  /  Alb  4.2  /  TBili  0.3  /  DBili  x   /  AST  24  /  ALT  39  /  AlkPhos  85  03-03    PT/INR - ( 03 Mar 2023 01:15 )   PT: 12.5 sec;   INR: 1.08 ratio         PTT - ( 03 Mar 2023 01:15 )  PTT:30.6 sec  Serum Pro-Brain Natriuretic Peptide: 54 pg/mL (03-03 @ 01:15)            CARDIAC MARKERS ( 03 Mar 2023 03:17 )  14 ng/L / x     / x     / x     / x     / 1.6 ng/mL  CARDIAC MARKERS ( 03 Mar 2023 01:15 )  17 ng/L / x     / x     / x     / x     / x          No Known Allergies    labetalol Injectable 10 milliGRAM(s) IV Push Once  potassium chloride    Tablet ER 40 milliEquivalent(s) Oral once    T(F): 97.9 (03-03), Max: 98 (03-03)  HR: 63 (03-03) (63 - 70)  BP: 164/89 (03-03) (164/89 - 201/114)  RR: 16 (03-03)  SpO2: 98% (03-03)
HPI:  Pt is a 51 yo M with PMH HTN, HLD, T2D, and CAD p/w acute onset CP and SOB x1d. Also with diaphoresis during episode; was getting into his car to go to work when symptoms started. Has been in his usual state of health prior to this. 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 Because of this, he also has not felt like taking his meds for the last few days. Follows with PCP regularly, last seen 12/2022 and otherwise usually compliant with meds. 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 Now feels back to normal and denies any chest pain or SOB. Is hoping to go home once tests are done if all are normal. No other concerns or complaints. Has been eating/drinking well, denies any changes to BMs or urination.    On arrival, T 98, HR 70, /114---174/103, RR 20 O2sat 100% RA. EKG NSR without acute ischemic changes; LVH and TWI laterally, similar to prior. Labs with K 3.4, trop neg x2, BNP WNL, and lactate 2.1. RVP neg. CXR neg. Cardiology consulted and recommending NST, TTE, and daily aspirin. Pt given aspirin load and labetalol 10mg IV.    Of note, per chart review, 2018 TTE with EF 62% and normal function.  (03 Mar 2023 09:20)      Reason for consult: Type 2 Diabetes    HPI:  The patient is a 51 yo M/F with PMH of HTN, HLD, Type 2 DM admitted with chest pain. Endocrinology was consulted for management of diabetes mellitus.    Noted to have A1c 11%.     Diabetes history:  Type 2 diabetes  Duration: More than 10 years    Home regimen: Jardiance 25 mg daily, metformin 1 g daily.  He reports extreme noncompliance with medications, reports that he does not take medications for up to 2 weeks at a time.    Diet: Reports a very poor diet, has a lot of [employer] and fried chicken.  Drinks a lot of sodas and juice.   Exercise: Minimal    Does not report any known complications of diabetes.  Manages diabetes with PCP, does not have an endocrinologist.    A1c: 11%  Weight: 117.4 kg  Inpatient diet: Carb consistent diet  Inpatient regimen: Low-dose sliding scale with meals and at bedtime      PAST MEDICAL & SURGICAL HISTORY:  HTN (hypertension)      HLD (hyperlipidemia)      Type 2 diabetes mellitus      CAD (coronary artery disease)      S/P foot surgery          FAMILY HISTORY:  Family history of diabetes mellitus (DM) (Mother)        Social History:  No tobacco, etoh or drug use    MEDICATIONS  (STANDING):  atorvastatin 20 milliGRAM(s) Oral at bedtime  dextrose 5%. 1000 milliLiter(s) (100 mL/Hr) IV Continuous <Continuous>  dextrose 5%. 1000 milliLiter(s) (50 mL/Hr) IV Continuous <Continuous>  dextrose 50% Injectable 25 Gram(s) IV Push once  dextrose 50% Injectable 12.5 Gram(s) IV Push once  dextrose 50% Injectable 25 Gram(s) IV Push once  enoxaparin Injectable 40 milliGRAM(s) SubCutaneous every 24 hours  glucagon  Injectable 1 milliGRAM(s) IntraMuscular once  hydrochlorothiazide 25 milliGRAM(s) Oral daily  insulin lispro (ADMELOG) corrective regimen sliding scale   SubCutaneous three times a day before meals  insulin lispro (ADMELOG) corrective regimen sliding scale   SubCutaneous at bedtime  lisinopril 20 milliGRAM(s) Oral daily  melatonin 3 milliGRAM(s) Oral at bedtime  NIFEdipine XL 60 milliGRAM(s) Oral daily    MEDICATIONS  (PRN):  acetaminophen     Tablet .. 650 milliGRAM(s) Oral every 6 hours PRN Temp greater or equal to 38C (100.4F), Mild Pain (1 - 3)  aluminum hydroxide/magnesium hydroxide/simethicone Suspension 30 milliLiter(s) Oral every 4 hours PRN Dyspepsia  dextrose Oral Gel 15 Gram(s) Oral once PRN Blood Glucose LESS THAN 70 milliGRAM(s)/deciliter  ondansetron Injectable 4 milliGRAM(s) IV Push every 8 hours PRN Nausea and/or Vomiting      Allergies    No Known Allergies    Intolerances        Review of Systems:  Constitutional: No fever, good appetite/po intake  Eyes: No blurry vision, diplopia  Neuro: No tremors  HEENT: No pain  Cardiovascular: No chest pain, palpitations  Respiratory: No SOB, no cough  GI: No nausea, vomiting  : No dysuria, hematuria  Skin: no rash  Psych: no depression  Endocrine: no polyuria, polydipsia  Hem/lymph: no swelling  Osteoporosis: no fractures    ALL OTHER SYSTEMS REVIEWED AND NEGATIVE    PHYSICAL EXAM:  VITALS: T(C): 36.6 (03-03-23 @ 15:02)  T(F): 97.8 (03-03-23 @ 15:02), Max: 98 (03-03-23 @ 00:20)  HR: 68 (03-03-23 @ 15:02) (63 - 74)  BP: 184/91 (03-03-23 @ 15:02) (164/89 - 201/114)  RR:  (16 - 22)  SpO2:  (98% - 100%)  Wt(kg): --  GENERAL: NAD, well-groomed, well-developed  EYES: No proptosis, extraocular movements intact,  no lid lag, anicteric  HEENT:  Atraumatic, Normocephalic, moist mucous membranes  RESPIRATORY: Normal chest expansion, no respiratory distress  CARDIOVASCULAR: Well perfused extremities, no peripheral edema  GI: Soft, nontender, non distended, normal bowel sounds  SKIN: Dry, intact, No rashes or lesions  EXTREMITIES: No foot ulcers, warm to touch   NEURO: sensation intact, no tremors  PSYCH: reactive affect, euthymic mood  ENDO: No stigmata of Cushings or acromegaly       Labs:                               14.8   10.48 )-----------( 218      ( 03 Mar 2023 01:15 )             47.6       03-03    139  |  102  |  10  ----------------------------<  224<H>  3.4<L>   |  25  |  0.96    eGFR: 95    Ca    9.1      03-03    TPro  7.5  /  Alb  4.2  /  TBili  0.3  /  DBili  x   /  AST  24  /  ALT  39  /  AlkPhos  85  03-03      03-03 @ 03:17 TSH 1.99 FreeT4 -- T3 -- Anti TPO -- Anti Thyroglobulin Ab -- TSI --      03-03 Chol 162 Direct LDL -- LDL calculated 100<H> HDL 37<L> Trig 125

## 2023-03-03 NOTE — DISCHARGE NOTE PROVIDER - CARE PROVIDER_API CALL
JOE PETERSON  Internal Medicine  [address] [place] [address]  Phone: [phone]  Fax: ()-  Follow Up Time: 2 weeks

## 2023-03-03 NOTE — H&P ADULT - NSHPSOCIALHISTORY_GEN_ALL_CORE
fully independent  works in environmental services @ [employer]  lives with friend  denies toxic substances

## 2023-03-03 NOTE — H&P ADULT - PROBLEM SELECTOR PLAN 1
- pt p/w acute onset CP, SOB, and diaphoresis while getting into his car to go to work; similar episode 2018 at which time ECHO was normal and LHC not performed   - pt admits to increased work stressors, possibly contributing to presentation  - hemodynamically stable on arrival; HTN SBP 200s now improved  - CBC and CMP largely unremarkable  - trop neg x2, BNP neg  - EKG NSR without acute ischemic changes; LVH and TWI laterally similar to prior  - s/p ASA 325mg in ER  - cardiology consulted, recommending NST, TTE, lipid/A1C/TSH, daily ASA  - c/w ASA 81mg daily, atorvastatin 20mg daily  - f/u NST, TTE  - check lipid, A1C, TSH  - continue to monitor on tele - pt p/w acute onset CP, SOB, and diaphoresis while getting into his car to go to work; similar episode 2018 at which time ECHO was normal and LHC not performed   - pt admits to increased work stressors, possibly contributing to presentation; has not taken home meds in a few days d/t stress  - hemodynamically stable on arrival; HTN SBP 200s now improved  - CBC and CMP largely unremarkable  - trop neg x2, BNP neg  - EKG NSR without acute ischemic changes; LVH and TWI laterally similar to prior  - s/p ASA 325mg in ER  - cardiology consulted, recommending NST, TTE, lipid/A1C/TSH, daily ASA  - c/w ASA 81mg daily, atorvastatin 20mg daily  - f/u NST, TTE  - check lipid, A1C, TSH  - continue to monitor on tele

## 2023-03-03 NOTE — ED ADULT NURSE NOTE - OBJECTIVE STATEMENT
Pt received in rm 14. C/o chest pain associated with SOB and diaphoresis while he was in his car on his way to work. Symptoms have resolved since arrival to ED. States he's been stressed out because of work. PMH DM, HTN. A&Ox4, ambulatory. Breathing even and unlabored. NSR on the cardiac monitor. Noted to be hypertensive. Denies headache, neck pain, vision changes, nausea or vomiting. Verbalized not taking medications this morning. 20G IV placed on left AC. Labs drawn and sent. Pt medicated per MAR. Waiting for XR.

## 2023-03-03 NOTE — DISCHARGE NOTE PROVIDER - NSDCCPCAREPLAN_GEN_ALL_CORE_FT
PRINCIPAL DISCHARGE DIAGNOSIS  Diagnosis: Chest pain  Assessment and Plan of Treatment: You came to the hospital with chest pain with concern for a heart attack. Your EKG, which looks at the rhythm of your heart, was normal. An ultrasound of your heart showed ___. A nuclear stress test of your heart showed ___. Cardiology evaluated you and recommended medical management. Please follow up with your primary care physician and cardiology within 2 weeks of discharge. Please return to the hospital if symptoms return or worsen.      SECONDARY DISCHARGE DIAGNOSES  Diagnosis: Hypertension  Assessment and Plan of Treatment: While in the hospital, your blood pressure was noted to be significantly elevated to the 200s/100s. This is likely due to missing a few days of your home medications. You received one dose of IV medication which improved your blood pressure and then were started on your home regimen. We strongly encourage you to continue taking your home medications as prescribed, as it is possible your chest pain was also in the setting of your significantly elevated blood pressure.     PRINCIPAL DISCHARGE DIAGNOSIS  Diagnosis: Chest pain  Assessment and Plan of Treatment: You came to the hospital with chest pain with concern for a heart attack. Your EKG, which looks at the rhythm of your heart, was normal. An ultrasound of your heart showed no abnormalities. A nuclear stress test of your heart showed ___. Cardiology evaluated you and recommended medical management. Please follow up with your primary care physician and cardiology within 2 weeks of discharge. Please return to the hospital if symptoms return or worsen.      SECONDARY DISCHARGE DIAGNOSES  Diagnosis: Hypertension  Assessment and Plan of Treatment: While in the hospital, your blood pressure was noted to be significantly elevated to the 200s/100s. This is likely due to missing a few days of your home medications. You received one dose of IV medication which improved your blood pressure and then were started on your home regimen. We strongly encourage you to continue taking your home medications as prescribed, as it is possible your chest pain was also in the setting of your significantly elevated blood pressure.    Diagnosis: Type 2 diabetes mellitus  Assessment and Plan of Treatment: While in the hospital, your A1C (an average of your blood glucose for the last 3 months) was elevated at 11. We like this number around 7. Endocrinology evaluated you and recommended ____.     PRINCIPAL DISCHARGE DIAGNOSIS  Diagnosis: Chest pain  Assessment and Plan of Treatment: You came to the hospital with chest pain with concern for a heart attack. Your EKG, which looks at the rhythm of your heart, was normal. An ultrasound of your heart showed no abnormalities. A nuclear stress test of your heart showed no ischemia. Cardiology evaluated you and recommended medical management. Please follow up with your primary care physician and cardiology within 2 weeks of discharge. Please return to the hospital if symptoms return or worsen.      SECONDARY DISCHARGE DIAGNOSES  Diagnosis: Hypertension  Assessment and Plan of Treatment: While in the hospital, your blood pressure was noted to be significantly elevated to the 200s/100s. This is likely due to missing a few days of your home medications. You received one dose of IV medication which improved your blood pressure and then were started on your home regimen. We strongly encourage you to continue taking your home medications as prescribed, as it is possible your chest pain was also in the setting of your significantly elevated blood pressure.    Diagnosis: Type 2 diabetes mellitus  Assessment and Plan of Treatment: While in the hospital, your A1C (an average of your blood glucose for the last 3 months) was elevated at 11. We like this number around 7. Endocrinology evaluated you and modified your meds

## 2023-03-03 NOTE — H&P ADULT - NSHPPHYSICALEXAM_GEN_ALL_CORE
VITAL SIGNS:  T(C): 36.7 (03-03-23 @ 08:38), Max: 36.7 (03-03-23 @ 00:20)  T(F): 98 (03-03-23 @ 08:38), Max: 98 (03-03-23 @ 00:20)  HR: 74 (03-03-23 @ 08:38) (63 - 74)  BP: 187/108 (03-03-23 @ 09:43) (164/89 - 201/114)  BP(mean): --  RR: 18 (03-03-23 @ 08:38) (16 - 22)  SpO2: 100% (03-03-23 @ 08:38) (98% - 100%)  Wt(kg): --    PHYSICAL EXAM:  Constitutional: WDWN resting comfortably in bed; NAD  Head: NC/AT  Eyes: PERRL, EOMI, anicteric sclera  ENT: no nasal discharge; MMM  Neck: supple; no JVD  Respiratory: CTA B/L; no W/R/R; comfortable on RA  Cardiac: +S1/S2; RRR; no M/R/G  Gastrointestinal: soft, NT/ND; no rebound or guarding; +BSx4  Extremities: WWP, no clubbing or cyanosis; no peripheral edema  Musculoskeletal: NROM x4; no joint swelling, tenderness or erythema  Vascular: 2+ radial, DP/PT pulses B/L  Dermatologic: skin warm, dry and intact; no rashes, wounds, or scars  Neurologic: AAOx3; CNII-XII grossly intact; no focal deficits  Psychiatric: affect and characteristics of appearance, verbalizations, behaviors are appropriate

## 2023-03-03 NOTE — ED PROVIDER NOTE - OBJECTIVE STATEMENT
52Y M PMH HTN, HLD, T2DM, presents emergency department for 1 episode of left substernal chest pain associated with diaphoresis.  Patient reports was in the parking lot getting ready for work when he was feeling stressed and suddenly started feeling pressure squeezing pain in his left chest.  Symptoms lasted several minutes before resolution.  Currently chest pain-free.  Had similar episode in the past for which she was admitted to the hospital with STEMI, echo was normal that time patient was not cath.  No aspirin prior to arrival

## 2023-03-03 NOTE — ED PROVIDER NOTE - CLINICAL SUMMARY MEDICAL DECISION MAKING FREE TEXT BOX
CC: Chest pain  HPI Brief/Pertinent PE:  52Y M PMH HTN, HLD, T2DM, presents emergency department for 1 episode of left substernal chest pain associated with diaphoresis.  Patient reports was in the parking lot getting ready for work when he was feeling stressed and suddenly started feeling pressure squeezing pain in his left chest.  Symptoms lasted several minutes before resolution.  Currently chest pain-free.  Had similar episode in the past for which she was admitted to the hospital with STEMI, echo was normal that time patient was not cath.  No aspirin prior to arrival    Hypertensive to 200/114, afebrile, 100% O2.  Stable currently well-appearing in no acute distress with clear lungs soft abdomen no peripheral edema and bilateral pulses intact and equal.  Good mentation good peripheral perfusion    DDx: Unstable angina, aortic dissection unlikely, PE unlikely, pneumothorax unlikely    Risk Factors: Diabetic hypertensive, prior MI?,  Obese    Labs/Rads: Chest x-ray CBC CMP troponin INR    Consults: Cardiology    Fam/Collateral: No collateral available at this time    Medical Decisions/Progress:  High risk chest pain and patient with abnormal EKG findings minimal ST elevations in the lateral leads with reciprocal changes, similar to previous EKG.  Suspect unstable angina, will likely need admission to the hospital for coronary cath.  Bilateral breath sounds intact unlikely pneumothorax, hypertensive bilaterally with equal pulses not radiating to the back and transient pain unlikely dissection.  No risk factors for PE CC: Chest pain  HPI Brief/Pertinent PE:  52Y M PMH HTN, HLD, T2DM, presents emergency department for 1 episode of left substernal chest pain associated with diaphoresis.  Patient reports was in the parking lot getting ready for work when he was feeling stressed and suddenly started feeling pressure squeezing pain in his left chest.  Symptoms lasted several minutes before resolution.  Currently chest pain-free.  Had similar episode in the past for which she was admitted to the hospital with STEMI, echo was normal that time patient was not cath.  No aspirin prior to arrival    Hypertensive to 200/114, afebrile, 100% O2.  Stable currently well-appearing in no acute distress with clear lungs soft abdomen no peripheral edema and bilateral pulses intact and equal.  Good mentation good peripheral perfusion    DDx: Unstable angina, aortic dissection unlikely, PE unlikely, pneumothorax unlikely    Risk Factors: Diabetic hypertensive, prior MI?,  Obese    Labs/Rads: Chest x-ray CBC CMP troponin INR    Consults: Cardiology    Fam/Collateral: No collateral available at this time    Medical Decisions/Progress:  High risk chest pain and patient with abnormal EKG findings minimal ST elevations in the lateral leads with reciprocal changes, similar to previous EKG.  Suspect unstable angina, will likely need admission to the hospital for coronary cath.  Bilateral breath sounds intact unlikely pneumothorax, hypertensive bilaterally with equal pulses not radiating to the back and transient pain unlikely dissection.  No risk factors for PE  - 3:20 AM: Paged teledoc of the day with no contest  - 6:07 AM: After second page without answer, hospitalist contacted for admission. Trops negative x2, chest pain has not recurred. Cardiology consulted.

## 2023-03-03 NOTE — DISCHARGE NOTE PROVIDER - HOSPITAL COURSE
51 yo M with PMH HTN, HLD, T2D, and CAD p/w acute onset CP and SOB x1d. EKG NSR without acute ischemic changes. Cardiology following with recommendation for NST and TTE.     Problem/Plan - 1:  ·  Problem: Angina pectoris.   ·  Plan: - pt p/w acute onset CP, SOB, and diaphoresis while getting into his car to go to work; similar episode 2018 at which time ECHO was normal and LHC not performed   - pt admits to increased work stressors, possibly contributing to presentation; has not taken home meds in a few days d/t stress  - hemodynamically stable on arrival; HTN SBP 200s now improved  - CBC and CMP largely unremarkable  - trop neg x2, BNP neg  - EKG NSR without acute ischemic changes; LVH and TWI laterally similar to prior  - s/p ASA 325mg in ER  - cardiology consulted, recommending NST, TTE, lipid/A1C/TSH, daily ASA  - c/w ASA 81mg daily, atorvastatin 20mg daily  - NST with ___  - TTE with ___  - lipid __, TSH __, A1C __     Problem/Plan - 2:  ·  Problem: Lactic acidosis.   ·  Plan: - lactate 2.1 on arrival  - warm and well perfused on exam; not meeting SIRS criteria  - likely demand in setting of poor PO intake and lack of home meds with HTN   - will not continue to trend     Problem/Plan - 3:  ·  Problem: Hypertension.   ·  Plan: - home meds: HCTZ-lisinopril 25-20mg daily, nifedipine 60mg daily  - HTN to SBP 200s on arrival, now improved after 10mg IV labetalol  - has not taken home meds in a few days, likely contributing to elevated BP  - c/w home meds.     Problem/Plan - 4:  ·  Problem: HLD (hyperlipidemia).   ·  Plan: - home med: atorvastatin 20mg daily  - c/w home med.     Problem/Plan - 5:  ·  Problem: CAD (coronary artery disease).   ·  Plan: - home meds: atorvastatin 20mg daily, HCTZ-lisinopril 25-20mg  - EKG stable from prior  - trop neg x2  - low suspicion for ACS at present   - c/w home meds     Problem/Plan - 6:  ·  Problem: Type 2 diabetes mellitus.   ·  Plan: - home meds: jardiance 25mg daily, metformin 1g BID  - mISS while inpt  - A1C __  - carb consistent diet. 51 yo M with PMH HTN, HLD, T2D, and CAD p/w acute onset CP and SOB x1d. EKG NSR without acute ischemic changes. Cardiology following with recommendation for NST and TTE.     Problem/Plan - 1:  ·  Problem: Angina pectoris.   ·  Plan: - pt p/w acute onset CP, SOB, and diaphoresis while getting into his car to go to work; similar episode 2018 at which time ECHO was normal and LHC not performed   - pt admits to increased work stressors, possibly contributing to presentation; has not taken home meds in a few days d/t stress  - hemodynamically stable on arrival; HTN SBP 200s now improved  - CBC and CMP largely unremarkable  - trop neg x2, BNP neg  - EKG NSR without acute ischemic changes; LVH and TWI laterally similar to prior  - s/p ASA 325mg in ER  - cardiology consulted, recommending NST, TTE, lipid/A1C/TSH, daily ASA  - c/w ASA 81mg daily, atorvastatin 20mg daily  - NST with ___  - TTE WNL  - lipid WNL, TSH WNL, A1C 11     Problem/Plan - 2:  ·  Problem: Lactic acidosis.   ·  Plan: - lactate 2.1 on arrival  - warm and well perfused on exam; not meeting SIRS criteria  - likely demand in setting of poor PO intake and lack of home meds with HTN   - will not continue to trend     Problem/Plan - 3:  ·  Problem: Hypertension.   ·  Plan: - home meds: HCTZ-lisinopril 25-20mg daily, nifedipine 60mg daily  - HTN to SBP 200s on arrival, now improved after 10mg IV labetalol  - has not taken home meds in a few days, likely contributing to elevated BP  - c/w home meds.     Problem/Plan - 4:  ·  Problem: HLD (hyperlipidemia).   ·  Plan: - home med: atorvastatin 20mg daily  - c/w home med.     Problem/Plan - 5:  ·  Problem: CAD (coronary artery disease).   ·  Plan: - home meds: atorvastatin 20mg daily, HCTZ-lisinopril 25-20mg  - EKG stable from prior  - trop neg x2  - low suspicion for ACS at present   - c/w home meds     Problem/Plan - 6:  ·  Problem: Type 2 diabetes mellitus.   ·  Plan: - home meds: jardiance 25mg daily, metformin 1g BID  - mISS while inpt  - A1C 11  - endocrine consulted with recs: ____  - carb consistent diet. 53 yo M with PMH HTN, HLD, T2D, and CAD p/w acute onset CP and SOB x1d. EKG NSR without acute ischemic changes. Cardiology following with recommendation for NST and TTE.     Problem/Plan - 1:  ·  Problem: Angina pectoris.   ·  Plan: - pt p/w acute onset CP, SOB, and diaphoresis while getting into his car to go to work; similar episode 2018 at which time ECHO was normal and LHC not performed   - pt admits to increased work stressors, possibly contributing to presentation; has not taken home meds in a few days d/t stress  - hemodynamically stable on arrival; HTN SBP 200s now improved  - CBC and CMP largely unremarkable  - trop neg x2, BNP neg  - EKG NSR without acute ischemic changes; LVH and TWI laterally similar to prior  - s/p ASA 325mg in ER  - c/w ASA 81mg daily, atorvastatin 20mg daily  - NST with no evidence of MI or ischemia  - TTE WNL  - lipid WNL, TSH WNL, A1C 11     Problem/Plan - 2:  ·  Problem: Lactic acidosis.   ·  Plan: - lactate 2.1 on arrival  - warm and well perfused on exam; not meeting SIRS criteria  - likely demand in setting of poor PO intake and lack of home meds with HTN   - will not continue to trend     Problem/Plan - 3:  ·  Problem: Hypertension.   ·  Plan: - home meds: HCTZ-lisinopril 25-20mg daily, nifedipine 60mg daily  - HTN to SBP 200s on arrival, now improved after 10mg IV labetalol  - has not taken home meds in a few days, likely contributing to elevated BP  - c/w home meds.     Problem/Plan - 4:  ·  Problem: HLD (hyperlipidemia).   ·  Plan: - home med: atorvastatin 20mg daily  - c/w home med.     Problem/Plan - 5:  ·  Problem: CAD (coronary artery disease).   ·  Plan: - home meds: atorvastatin 20mg daily, HCTZ-lisinopril 25-20mg  - EKG stable from prior  - trop neg x2  - low suspicion for ACS at present   - c/w home meds     Problem/Plan - 6:  ·  Problem: Type 2 diabetes mellitus.   ·  Plan: - home meds: jardiance 25mg daily, metformin 1g BID  - mISS while inpt  - A1C 11  - endocrine consulted with recs: ____  - carb consistent diet.    Vital Signs Last 24 Hrs  T(C): 36.9 (06 Mar 2023 05:54), Max: 36.9 (06 Mar 2023 05:54)  T(F): 98.5 (06 Mar 2023 05:54), Max: 98.5 (06 Mar 2023 05:54)  HR: 97 (06 Mar 2023 05:54) (76 - 99)  BP: 139/93 (06 Mar 2023 05:54) (122/80 - 139/93)  BP(mean): --  RR: 18 (06 Mar 2023 05:54) (16 - 18)  SpO2: 99% (06 Mar 2023 05:54) (98% - 99%)    Parameters below as of 06 Mar 2023 05:54  Patient On (Oxygen Delivery Method): room air        PHYSICAL EXAM:  GENERAL: NAD,   EYES:  conjunctiva and sclera clear  CHEST/LUNG: Clear to auscultation bilaterally; No wheeze  HEART: Regular rate and rhythm; No murmurs, rubs, or gallops  ABDOMEN: Soft, Nontender, Nondistended; Bowel sounds present  EXTREMITIES, No clubbing, cyanosis, or edema  PSYCH: AAOx3   51 yo M with PMH HTN, HLD, T2D, and CAD p/w acute onset CP and SOB x1d. EKG NSR without acute ischemic changes. Cardiology following with recommendation for NST and TTE.     Problem/Plan - 1:  ·  Problem: Angina pectoris.   ·  Plan: - pt p/w acute onset CP, SOB, and diaphoresis while getting into his car to go to work; similar episode 2018 at which time ECHO was normal and LHC not performed   - pt admits to increased work stressors, possibly contributing to presentation; has not taken home meds in a few days d/t stress  - hemodynamically stable on arrival; HTN SBP 200s now improved  - CBC and CMP largely unremarkable  - trop neg x2, BNP neg  - EKG NSR without acute ischemic changes; LVH and TWI laterally similar to prior  - s/p ASA 325mg in ER  - c/w ASA 81mg daily, atorvastatin 20mg daily  - NST with no evidence of MI or ischemia  - TTE WNL  - lipid WNL, TSH WNL, A1C 11     Problem/Plan - 2:  ·  Problem: Lactic acidosis.   ·  Plan: - lactate 2.1 on arrival  - warm and well perfused on exam; not meeting SIRS criteria  - likely demand in setting of poor PO intake and lack of home meds with HTN        Problem/Plan - 3:  ·  Problem: Hypertension.   ·  Plan: - home meds: HCTZ-lisinopril 25-20mg daily, nifedipine 60mg daily  - HTN to SBP 200s on arrival, now improved after 10mg IV labetalol  - has not taken home meds in a few days, likely contributing to elevated BP  - c/w home meds.     Problem/Plan - 4:  ·  Problem: HLD (hyperlipidemia).   ·  Plan: - home med: atorvastatin 20mg daily  - c/w home med.     Problem/Plan - 5:  ·  Problem: CAD (coronary artery disease).   ·  Plan: - home meds: atorvastatin 20mg daily, HCTZ-lisinopril 25-20mg  - EKG stable from prior  - trop neg x2  - low suspicion for ACS at present   - c/w home meds     Problem/Plan - 6:  ·  Problem: Type 2 diabetes mellitus.   ·  Plan: - home meds: jardiance 25mg daily, metformin 1g BID  - mISS while inpt  - A1C 11  - dischargeendocrine consulted with recs:Lantus solostar pen 15 units sq qhs + Metformin 1000 mg BID + Jardiance 25 mg daily   - carb consistent diet.    Vital Signs Last 24 Hrs  T(C): 36.9 (06 Mar 2023 05:54), Max: 36.9 (06 Mar 2023 05:54)  T(F): 98.5 (06 Mar 2023 05:54), Max: 98.5 (06 Mar 2023 05:54)  HR: 97 (06 Mar 2023 05:54) (76 - 99)  BP: 139/93 (06 Mar 2023 05:54) (122/80 - 139/93)  BP(mean): --  RR: 18 (06 Mar 2023 05:54) (16 - 18)  SpO2: 99% (06 Mar 2023 05:54) (98% - 99%)    Parameters below as of 06 Mar 2023 05:54  Patient On (Oxygen Delivery Method): room air        PHYSICAL EXAM:  GENERAL: NAD,   EYES:  conjunctiva and sclera clear  CHEST/LUNG: Clear to auscultation bilaterally; No wheeze  HEART: Regular rate and rhythm; No murmurs, rubs, or gallops  ABDOMEN: Soft, Nontender, Nondistended; Bowel sounds present  EXTREMITIES, No clubbing, cyanosis, or edema  PSYCH: AAOx3

## 2023-03-03 NOTE — H&P ADULT - HISTORY OF PRESENT ILLNESS
Pt is a 53 yo M with PMH HTN, HLD, T2D, and CAD p/w acute onset CP and SOB x1d. Also with diaphoresis during episode; was getting into his car to go to work when symptoms started.     On arrival, T 98, HR 70, /114---174/103, RR 20 O2sat 100% RA. EKG NSR without acute ischemic changes; LVH and TWI laterally, similar to prior. Labs with K 3.4, trop neg x2, BNP WNL, and lactate 2.1. RVP neg. CXR neg. Cardiology consulted and recommending NST, TTE, and daily aspirin. Pt given aspirin load and labetalol 10mg IV.    Of note, per chart review, 2018 TTE with EF 62% and normal function.  Pt is a 51 yo M with PMH HTN, HLD, T2D, and CAD p/w acute onset CP and SOB x1d. Also with diaphoresis during episode; was getting into his car to go to work when symptoms started. Has been in his usual state of health prior to this. 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 Because of this, he also has not felt like taking his meds for the last few days. Follows with PCP regularly, last seen 12/2022 and otherwise usually compliant with meds. 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 Now feels back to normal and denies any chest pain or SOB. Is hoping to go home once tests are done if all are normal. No other concerns or complaints. Has been eating/drinking well, denies any changes to BMs or urination.    On arrival, T 98, HR 70, /114---174/103, RR 20 O2sat 100% RA. EKG NSR without acute ischemic changes; LVH and TWI laterally, similar to prior. Labs with K 3.4, trop neg x2, BNP WNL, and lactate 2.1. RVP neg. CXR neg. Cardiology consulted and recommending NST, TTE, and daily aspirin. Pt given aspirin load and labetalol 10mg IV.    Of note, per chart review, 2018 TTE with EF 62% and normal function.

## 2023-03-03 NOTE — DISCHARGE NOTE PROVIDER - NSFOLLOWUPCLINICSTOKEN_GEN_ALL_ED_FT
686162:1 month|| ||00\01||False; 696300:1 month|| ||00\01||False;805044:2 weeks|| ||00\01||False; 753966:2 weeks|| ||00\01||False;

## 2023-03-03 NOTE — H&P ADULT - ASSESSMENT
Pt is a 51 yo M with PMH HTN, HLD, T2D, and CAD p/w acute onset CP and SOB x1d. EKG NSR without acute ischemic changes. Cardiology following with recommendation for NST and TTE.

## 2023-03-04 LAB
ANION GAP SERPL CALC-SCNC: 15 MMOL/L — HIGH (ref 7–14)
BUN SERPL-MCNC: 13 MG/DL — SIGNIFICANT CHANGE UP (ref 7–23)
CALCIUM SERPL-MCNC: 9.3 MG/DL — SIGNIFICANT CHANGE UP (ref 8.4–10.5)
CHLORIDE SERPL-SCNC: 100 MMOL/L — SIGNIFICANT CHANGE UP (ref 98–107)
CO2 SERPL-SCNC: 23 MMOL/L — SIGNIFICANT CHANGE UP (ref 22–31)
CREAT SERPL-MCNC: 0.97 MG/DL — SIGNIFICANT CHANGE UP (ref 0.5–1.3)
EGFR: 94 ML/MIN/1.73M2 — SIGNIFICANT CHANGE UP
GLUCOSE BLDC GLUCOMTR-MCNC: 174 MG/DL — HIGH (ref 70–99)
GLUCOSE BLDC GLUCOMTR-MCNC: 188 MG/DL — HIGH (ref 70–99)
GLUCOSE BLDC GLUCOMTR-MCNC: 206 MG/DL — HIGH (ref 70–99)
GLUCOSE BLDC GLUCOMTR-MCNC: 215 MG/DL — HIGH (ref 70–99)
GLUCOSE SERPL-MCNC: 216 MG/DL — HIGH (ref 70–99)
HCT VFR BLD CALC: 52.3 % — HIGH (ref 39–50)
HGB BLD-MCNC: 16.4 G/DL — SIGNIFICANT CHANGE UP (ref 13–17)
MAGNESIUM SERPL-MCNC: 2 MG/DL — SIGNIFICANT CHANGE UP (ref 1.6–2.6)
MCHC RBC-ENTMCNC: 25.7 PG — LOW (ref 27–34)
MCHC RBC-ENTMCNC: 31.4 GM/DL — LOW (ref 32–36)
MCV RBC AUTO: 82 FL — SIGNIFICANT CHANGE UP (ref 80–100)
NRBC # BLD: 0 /100 WBCS — SIGNIFICANT CHANGE UP (ref 0–0)
NRBC # FLD: 0 K/UL — SIGNIFICANT CHANGE UP (ref 0–0)
PHOSPHATE SERPL-MCNC: 2.6 MG/DL — SIGNIFICANT CHANGE UP (ref 2.5–4.5)
PLATELET # BLD AUTO: 241 K/UL — SIGNIFICANT CHANGE UP (ref 150–400)
POTASSIUM SERPL-MCNC: 3.1 MMOL/L — LOW (ref 3.5–5.3)
POTASSIUM SERPL-SCNC: 3.1 MMOL/L — LOW (ref 3.5–5.3)
RBC # BLD: 6.38 M/UL — HIGH (ref 4.2–5.8)
RBC # FLD: 13.2 % — SIGNIFICANT CHANGE UP (ref 10.3–14.5)
SODIUM SERPL-SCNC: 138 MMOL/L — SIGNIFICANT CHANGE UP (ref 135–145)
WBC # BLD: 10.99 K/UL — HIGH (ref 3.8–10.5)
WBC # FLD AUTO: 10.99 K/UL — HIGH (ref 3.8–10.5)

## 2023-03-04 PROCEDURE — 99232 SBSQ HOSP IP/OBS MODERATE 35: CPT

## 2023-03-04 RX ORDER — INSULIN LISPRO 100/ML
VIAL (ML) SUBCUTANEOUS AT BEDTIME
Refills: 0 | Status: DISCONTINUED | OUTPATIENT
Start: 2023-03-04 | End: 2023-03-06

## 2023-03-04 RX ORDER — SODIUM,POTASSIUM PHOSPHATES 278-250MG
1 POWDER IN PACKET (EA) ORAL ONCE
Refills: 0 | Status: COMPLETED | OUTPATIENT
Start: 2023-03-04 | End: 2023-03-05

## 2023-03-04 RX ORDER — POTASSIUM CHLORIDE 20 MEQ
40 PACKET (EA) ORAL EVERY 4 HOURS
Refills: 0 | Status: COMPLETED | OUTPATIENT
Start: 2023-03-04 | End: 2023-03-04

## 2023-03-04 RX ORDER — SODIUM CHLORIDE 9 MG/ML
1000 INJECTION, SOLUTION INTRAVENOUS
Refills: 0 | Status: COMPLETED | OUTPATIENT
Start: 2023-03-04 | End: 2023-03-04

## 2023-03-04 RX ORDER — INSULIN GLARGINE 100 [IU]/ML
15 INJECTION, SOLUTION SUBCUTANEOUS AT BEDTIME
Refills: 0 | Status: DISCONTINUED | OUTPATIENT
Start: 2023-03-04 | End: 2023-03-05

## 2023-03-04 RX ORDER — INSULIN LISPRO 100/ML
VIAL (ML) SUBCUTANEOUS
Refills: 0 | Status: DISCONTINUED | OUTPATIENT
Start: 2023-03-04 | End: 2023-03-06

## 2023-03-04 RX ORDER — INSULIN LISPRO 100/ML
2 VIAL (ML) SUBCUTANEOUS
Refills: 0 | Status: DISCONTINUED | OUTPATIENT
Start: 2023-03-04 | End: 2023-03-04

## 2023-03-04 RX ADMIN — Medication 650 MILLIGRAM(S): at 11:33

## 2023-03-04 RX ADMIN — Medication 60 MILLIGRAM(S): at 06:26

## 2023-03-04 RX ADMIN — LISINOPRIL 20 MILLIGRAM(S): 2.5 TABLET ORAL at 06:26

## 2023-03-04 RX ADMIN — Medication 2: at 18:23

## 2023-03-04 RX ADMIN — Medication 2: at 13:30

## 2023-03-04 RX ADMIN — ENOXAPARIN SODIUM 40 MILLIGRAM(S): 100 INJECTION SUBCUTANEOUS at 17:47

## 2023-03-04 RX ADMIN — Medication 40 MILLIEQUIVALENT(S): at 09:32

## 2023-03-04 RX ADMIN — Medication 3 MILLIGRAM(S): at 22:18

## 2023-03-04 RX ADMIN — SODIUM CHLORIDE 75 MILLILITER(S): 9 INJECTION, SOLUTION INTRAVENOUS at 16:51

## 2023-03-04 RX ADMIN — Medication 650 MILLIGRAM(S): at 12:30

## 2023-03-04 RX ADMIN — Medication 2: at 09:28

## 2023-03-04 RX ADMIN — INSULIN GLARGINE 15 UNIT(S): 100 INJECTION, SOLUTION SUBCUTANEOUS at 22:16

## 2023-03-04 RX ADMIN — ATORVASTATIN CALCIUM 20 MILLIGRAM(S): 80 TABLET, FILM COATED ORAL at 22:16

## 2023-03-04 RX ADMIN — Medication 81 MILLIGRAM(S): at 11:33

## 2023-03-04 RX ADMIN — Medication 40 MILLIEQUIVALENT(S): at 13:31

## 2023-03-04 NOTE — DIETITIAN INITIAL EVALUATION ADULT - ADD RECOMMEND
1. Monitor and replete electrolytes.  2. Monitor weight, labs, po intake and tolerance, bowel movement, skin integrity.  3. Encourage PO intake and honor food preferences as able.

## 2023-03-04 NOTE — DIETITIAN INITIAL EVALUATION ADULT - PERTINENT LABORATORY DATA
03-04    138  |  100  |  13  ----------------------------<  216<H>  3.1<L>   |  23  |  0.97    Ca    9.3      04 Mar 2023 06:00  Phos  2.6     03-04  Mg     2.00     03-04    TPro  7.5  /  Alb  4.2  /  TBili  0.3  /  DBili  x   /  AST  24  /  ALT  39  /  AlkPhos  85  03-03  POCT Blood Glucose.: 206 mg/dL (03-04-23 @ 12:46)  A1C with Estimated Average Glucose Result: 11.0 % (03-03-23 @ 01:15)

## 2023-03-04 NOTE — DIETITIAN INITIAL EVALUATION ADULT - ORAL INTAKE PTA/DIET HISTORY
Patient reports height of 5'11", unknown usual body weight. Patient does not follow any special diet at home, denies any difficulty obtaining grocery and preparing meals. Patient denies any appetite change or weight change prior to hospitalization. Patient has no known food allergies.

## 2023-03-04 NOTE — DIETITIAN INITIAL EVALUATION ADULT - PERTINENT MEDS FT
MEDICATIONS  (STANDING):  aspirin  chewable 81 milliGRAM(s) Oral daily  atorvastatin 20 milliGRAM(s) Oral at bedtime  dextrose 5%. 1000 milliLiter(s) (100 mL/Hr) IV Continuous <Continuous>  dextrose 5%. 1000 milliLiter(s) (50 mL/Hr) IV Continuous <Continuous>  dextrose 50% Injectable 25 Gram(s) IV Push once  dextrose 50% Injectable 12.5 Gram(s) IV Push once  dextrose 50% Injectable 25 Gram(s) IV Push once  enoxaparin Injectable 40 milliGRAM(s) SubCutaneous every 24 hours  glucagon  Injectable 1 milliGRAM(s) IntraMuscular once  hydrochlorothiazide 25 milliGRAM(s) Oral daily  insulin glargine Injectable (LANTUS) 12 Unit(s) SubCutaneous at bedtime  insulin lispro (ADMELOG) corrective regimen sliding scale   SubCutaneous three times a day before meals  insulin lispro (ADMELOG) corrective regimen sliding scale   SubCutaneous at bedtime  lisinopril 20 milliGRAM(s) Oral daily  melatonin 3 milliGRAM(s) Oral at bedtime  NIFEdipine XL 60 milliGRAM(s) Oral daily  potassium phosphate / sodium phosphate Powder (PHOS-NaK) 1 Packet(s) Oral once    MEDICATIONS  (PRN):  acetaminophen     Tablet .. 650 milliGRAM(s) Oral every 6 hours PRN Temp greater or equal to 38C (100.4F), Mild Pain (1 - 3)  aluminum hydroxide/magnesium hydroxide/simethicone Suspension 30 milliLiter(s) Oral every 4 hours PRN Dyspepsia  dextrose Oral Gel 15 Gram(s) Oral once PRN Blood Glucose LESS THAN 70 milliGRAM(s)/deciliter  ondansetron Injectable 4 milliGRAM(s) IV Push every 8 hours PRN Nausea and/or Vomiting

## 2023-03-04 NOTE — DIETITIAN INITIAL EVALUATION ADULT - OTHER INFO
Pt is a 53 yo M with PMH HTN, HLD, T2D, and CAD p/w acute onset CP and SOB x1d. EKG NSR without acute ischemic changes. Cardiology following with recommendation for NST and TTE, per chart.   Patient reports consuming ~50% of breakfast this morning. Denies any difficulty chewing/ swallowing, any nausea, vomiting, diarrhea, constipation during visit. Reports last bowel movement 3/3/2023. Noted patient has low potassium, on potassium phosphate, KCl for repletion. Noted elevated HgA1c- 11% (3/3), POCT-215(3/4), 176(3/3). RD provided extensive verbal and written nutrition education on consistent carb, heart healthy nutrition therapy. Including explain HgA1c, source of carbohydrates, Plate method for diabetes, carb counting, nutrition labels. Encouraged patient to avoid food high in concentrated sweets and beverages, avoid saturated/ trans fat, avoid food high in sodium, increase intake of food high in fiber. Patient is receptive to the information provided.

## 2023-03-04 NOTE — CHART NOTE - NSCHARTNOTEFT_GEN_A_CORE
The patient is a 53 yo M/F with PMH of HTN, HLD, Type 2 DM admitted with chest pain. Endocrinology was consulted for management of diabetes mellitus.    #Type 2 Diabetes Mellitus  - HbA1c: 11%  - home regimen: non compliant with metformin and Jardiance  - Weight: 117.4 kg     Recommendations:   -Glucose target: 100-180mg/dl: slightly above goal this am   - Increase Lantus to 15 units sq QHS  - hold home PO meds  - Hold standing premeal for now   - Recommend change low to moderate dose lispro correction scale TIDQAC and moderate dose lispro correction scale at QHS  - Please check FSG before meals and QHS, or q6h while NPO  - RD consult  - hypoglycemia orderset prn  - extensively discussed importance of glycemic control to prevent micro- and macrovascular complications  - please have RN complete insulin teaching for discharge     Discharge planning:  Would benefit from addition of basal insulin in addition to oral agents.   Recommend Metformin 1000 mg BID + Jardiance 25 mg daily + basal insulin (if discharged today, can recommend Lantus 12u daily).   Will need insulin teaching, supplies, glucometer, insulin Rx at discharge.   Please send Lantus solostar pen as test script to check insurance coverage.  Ok to send with current doses and update prior to d/c  If Lantus not covered- can try alternating with one of following   tresiba/basaglar/toujeo/Levemir  Ensure patient has working glucometer, test strips, lancets, alcohol pads, and BD edward pen needles  Please also prescribe glucose tabs, Baqsimi nasal spray or glucagon emergency kit for hypoglycemia risk     Pt should follow up with Endocrinology post discharge.    ENDOCRINE FOLLOW UP  CALL PATIENT ACCESS SERVICES  [phone]  For all [facility] Endocrine Practices phone numbers and locations throughout [facility], and [facility].      If patient wishes to follow up with [facility] Endocrinology at [place]  [facility]  [address] [place] [address], [address], [place] [address]  [phone]   Please call to schedule appointment with CDE/Nutritionist and MD appointment next available       #Hypertension  - BP goal <130/80  - On Nifedipine and HCTZ  - Defer to Primary Team     #Hyperlipidemia  -LDL goal <70   -Reports non compliance with statin.   -Should continue on statin at discharge   -Obtain lipid profile as an outpt     #Metabolic Acidosis  -Anion gap 15   -Low risk for DKA; Received Lantus   -Trend Anion gap   -Defer to primary team     MEDICATIONS  (STANDING):  aspirin  chewable 81 milliGRAM(s) Oral daily  atorvastatin 20 milliGRAM(s) Oral at bedtime  dextrose 5%. 1000 milliLiter(s) (100 mL/Hr) IV Continuous <Continuous>  dextrose 5%. 1000 milliLiter(s) (50 mL/Hr) IV Continuous <Continuous>  dextrose 50% Injectable 25 Gram(s) IV Push once  dextrose 50% Injectable 12.5 Gram(s) IV Push once  dextrose 50% Injectable 25 Gram(s) IV Push once  enoxaparin Injectable 40 milliGRAM(s) SubCutaneous every 24 hours  glucagon  Injectable 1 milliGRAM(s) IntraMuscular once  hydrochlorothiazide 25 milliGRAM(s) Oral daily  insulin glargine Injectable (LANTUS) 12 Unit(s) SubCutaneous at bedtime  insulin lispro (ADMELOG) corrective regimen sliding scale   SubCutaneous three times a day before meals  insulin lispro (ADMELOG) corrective regimen sliding scale   SubCutaneous at bedtime  insulin lispro Injectable (ADMELOG) 2 Unit(s) SubCutaneous three times a day before meals  lisinopril 20 milliGRAM(s) Oral daily  melatonin 3 milliGRAM(s) Oral at bedtime  NIFEdipine XL 60 milliGRAM(s) Oral daily  potassium phosphate / sodium phosphate Powder (PHOS-NaK) 1 Packet(s) Oral once    MEDICATIONS  (PRN):  acetaminophen     Tablet .. 650 milliGRAM(s) Oral every 6 hours PRN Temp greater or equal to 38C (100.4F), Mild Pain (1 - 3)  aluminum hydroxide/magnesium hydroxide/simethicone Suspension 30 milliLiter(s) Oral every 4 hours PRN Dyspepsia  dextrose Oral Gel 15 Gram(s) Oral once PRN Blood Glucose LESS THAN 70 milliGRAM(s)/deciliter  ondansetron Injectable 4 milliGRAM(s) IV Push every 8 hours PRN Nausea and/or Vomiting      Allergies: No Known Allergies    PHYSICAL EXAM:  VITALS: T(C): 36.6 (03-04-23 @ 13:42)  T(F): 97.9 (03-04-23 @ 13:42), Max: 97.9 (03-03-23 @ 18:24)  HR: 86 (03-04-23 @ 13:42) (80 - 100)  BP: 132/86 (03-04-23 @ 13:42) (123/83 - 183/107)  RR:  (16 - 19)  SpO2:  (97% - 100%)  Wt(kg): -      CAPILLARY BLOOD GLUCOSE  POCT Blood Glucose.: 206 mg/dL (04 Mar 2023 12:46)  POCT Blood Glucose.: 215 mg/dL (04 Mar 2023 09:02)  POCT Blood Glucose.: 176 mg/dL (03 Mar 2023 22:28)  POCT Blood Glucose.: 170 mg/dL (03 Mar 2023 18:14)  POCT Blood Glucose.: 176 mg/dL (03 Mar 2023 17:20)    A1C with Estimated Average Glucose (03.03.23 @ 01:15)    A1C with Estimated Average Glucose Result: 11.0    Estimated Average Glucose: 269    03-04    138  |  100  |  13  ----------------------------<  216<H>  3.1<L>   |  23  |  0.97    eGFR: 94    Ca    9.3      03-04  Mg     2.00     03-04  Phos  2.6     03-04    TPro  7.5  /  Alb  4.2  /  TBili  0.3  /  DBili  x   /  AST  24  /  ALT  39  /  AlkPhos  85  03-03      Thyroid Function Tests:  03-03 @ 03:17 TSH 1.99 FreeT4 -- T3 -- Anti TPO -- Anti Thyroglobulin Ab -- TSI --      Diet, Regular:   Consistent Carbohydrate {Evening Snack} (CSTCHOSN)  DASH/TLC {Sodium & Cholesterol Restricted} (DASH)  Low Sodium  No Caffeine  No Carbonated Beverages  No Chocolate (03-03-23 @ 09:14) [Active]      Isela Encarnacion  Nurse Practitioner  Division of Endocrinology & Diabetes  In house pager #[phone]    If before 9AM or after 6PM, or on weekends/holidays, please call endocrine answering service for assistance ([phone]).For nonurgent matters email [email or web address] for assistance. The patient is a 53 yo M/F with PMH of HTN, HLD, Type 2 DM admitted with chest pain. Endocrinology was consulted for management of diabetes mellitus.    #Type 2 Diabetes Mellitus  - HbA1c: 11%  - home regimen: non compliant with metformin and Jardiance  - Weight: 117.4 kg     Recommendations:   -Glucose target: 100-180mg/dl: slightly above goal this am   - Increase Lantus to 15 units sq QHS  - hold home PO meds  - Hold standing premeal for now   - Recommend change low to moderate dose lispro correction scale TIDQAC and moderate dose lispro correction scale at QHS  - Please check FSG before meals and QHS, or q6h while NPO  - RD consult  - hypoglycemia orderset prn  - extensively discussed importance of glycemic control to prevent micro- and macrovascular complications  - please have RN complete insulin teaching for discharge     Discharge planning:  Would benefit from addition of basal insulin in addition to oral agents.   Recommend Metformin 1000 mg BID + Jardiance 25 mg daily + basal insulin (if discharged today, can recommend Lantus 15u daily).   Will need insulin teaching, supplies, glucometer, insulin Rx at discharge.   Please send Lantus solostar pen as test script to check insurance coverage.  Ok to send with current doses and update prior to d/c  If Lantus not covered- can try alternating with one of following   tresiba/basaglar/toujeo/Levemir  Ensure patient has working glucometer, test strips, lancets, alcohol pads, and BD edward pen needles  Please also prescribe glucose tabs, Baqsimi nasal spray or glucagon emergency kit for hypoglycemia risk     Pt should follow up with Endocrinology post discharge.    ENDOCRINE FOLLOW UP  CALL PATIENT ACCESS SERVICES  [phone]  For all [facility] Endocrine Practices phone numbers and locations throughout [facility], and [place].      If patient wishes to follow up with [facility] Endocrinology at [place]  [facility]  [address] [place] [address], [address], [place] [address]  [phone]   Please call to schedule appointment with CDE/Nutritionist and MD appointment next available       #Hypertension  - BP goal <130/80  - On Nifedipine and HCTZ  - Defer to Primary Team     #Hyperlipidemia  -LDL goal <70   -Reports non compliance with statin.   -Should continue on statin at discharge   -Obtain lipid profile as an outpt     #Metabolic Acidosis  -Anion gap 15   -Low risk for DKA; Received Lantus   -Trend Anion gap   -Defer to primary team     MEDICATIONS  (STANDING):  aspirin  chewable 81 milliGRAM(s) Oral daily  atorvastatin 20 milliGRAM(s) Oral at bedtime  dextrose 5%. 1000 milliLiter(s) (100 mL/Hr) IV Continuous <Continuous>  dextrose 5%. 1000 milliLiter(s) (50 mL/Hr) IV Continuous <Continuous>  dextrose 50% Injectable 25 Gram(s) IV Push once  dextrose 50% Injectable 12.5 Gram(s) IV Push once  dextrose 50% Injectable 25 Gram(s) IV Push once  enoxaparin Injectable 40 milliGRAM(s) SubCutaneous every 24 hours  glucagon  Injectable 1 milliGRAM(s) IntraMuscular once  hydrochlorothiazide 25 milliGRAM(s) Oral daily  insulin glargine Injectable (LANTUS) 12 Unit(s) SubCutaneous at bedtime  insulin lispro (ADMELOG) corrective regimen sliding scale   SubCutaneous three times a day before meals  insulin lispro (ADMELOG) corrective regimen sliding scale   SubCutaneous at bedtime  insulin lispro Injectable (ADMELOG) 2 Unit(s) SubCutaneous three times a day before meals  lisinopril 20 milliGRAM(s) Oral daily  melatonin 3 milliGRAM(s) Oral at bedtime  NIFEdipine XL 60 milliGRAM(s) Oral daily  potassium phosphate / sodium phosphate Powder (PHOS-NaK) 1 Packet(s) Oral once    MEDICATIONS  (PRN):  acetaminophen     Tablet .. 650 milliGRAM(s) Oral every 6 hours PRN Temp greater or equal to 38C (100.4F), Mild Pain (1 - 3)  aluminum hydroxide/magnesium hydroxide/simethicone Suspension 30 milliLiter(s) Oral every 4 hours PRN Dyspepsia  dextrose Oral Gel 15 Gram(s) Oral once PRN Blood Glucose LESS THAN 70 milliGRAM(s)/deciliter  ondansetron Injectable 4 milliGRAM(s) IV Push every 8 hours PRN Nausea and/or Vomiting      Allergies: No Known Allergies    PHYSICAL EXAM:  VITALS: T(C): 36.6 (03-04-23 @ 13:42)  T(F): 97.9 (03-04-23 @ 13:42), Max: 97.9 (03-03-23 @ 18:24)  HR: 86 (03-04-23 @ 13:42) (80 - 100)  BP: 132/86 (03-04-23 @ 13:42) (123/83 - 183/107)  RR:  (16 - 19)  SpO2:  (97% - 100%)  Wt(kg): -      CAPILLARY BLOOD GLUCOSE  POCT Blood Glucose.: 206 mg/dL (04 Mar 2023 12:46)  POCT Blood Glucose.: 215 mg/dL (04 Mar 2023 09:02)  POCT Blood Glucose.: 176 mg/dL (03 Mar 2023 22:28)  POCT Blood Glucose.: 170 mg/dL (03 Mar 2023 18:14)  POCT Blood Glucose.: 176 mg/dL (03 Mar 2023 17:20)    A1C with Estimated Average Glucose (03.03.23 @ 01:15)    A1C with Estimated Average Glucose Result: 11.0    Estimated Average Glucose: 269    03-04    138  |  100  |  13  ----------------------------<  216<H>  3.1<L>   |  23  |  0.97    eGFR: 94    Ca    9.3      03-04  Mg     2.00     03-04  Phos  2.6     03-04    TPro  7.5  /  Alb  4.2  /  TBili  0.3  /  DBili  x   /  AST  24  /  ALT  39  /  AlkPhos  85  03-03      Thyroid Function Tests:  03-03 @ 03:17 TSH 1.99 FreeT4 -- T3 -- Anti TPO -- Anti Thyroglobulin Ab -- TSI --      Diet, Regular:   Consistent Carbohydrate {Evening Snack} (CSTCHOSN)  DASH/TLC {Sodium & Cholesterol Restricted} (DASH)  Low Sodium  No Caffeine  No Carbonated Beverages  No Chocolate (03-03-23 @ 09:14) [Active]      Isela Encarnacion  Nurse Practitioner  Division of Endocrinology & Diabetes  In house pager #[phone]    If before 9AM or after 6PM, or on weekends/holidays, please call endocrine answering service for assistance ([phone]).For nonurgent matters email [email or web address] for assistance.

## 2023-03-04 NOTE — DIETITIAN INITIAL EVALUATION ADULT - NS FNS DIET ORDER
Diet, Regular:   Consistent Carbohydrate {Evening Snack} (CSTCHOSN)  DASH/TLC {Sodium & Cholesterol Restricted} (DASH)  Low Sodium  No Caffeine  No Carbonated Beverages  No Chocolate (03-03-23 @ 09:14)

## 2023-03-05 LAB
ANION GAP SERPL CALC-SCNC: 12 MMOL/L — SIGNIFICANT CHANGE UP (ref 7–14)
BUN SERPL-MCNC: 15 MG/DL — SIGNIFICANT CHANGE UP (ref 7–23)
CALCIUM SERPL-MCNC: 9.1 MG/DL — SIGNIFICANT CHANGE UP (ref 8.4–10.5)
CHLORIDE SERPL-SCNC: 100 MMOL/L — SIGNIFICANT CHANGE UP (ref 98–107)
CO2 SERPL-SCNC: 23 MMOL/L — SIGNIFICANT CHANGE UP (ref 22–31)
CREAT SERPL-MCNC: 1.06 MG/DL — SIGNIFICANT CHANGE UP (ref 0.5–1.3)
EGFR: 84 ML/MIN/1.73M2 — SIGNIFICANT CHANGE UP
GLUCOSE BLDC GLUCOMTR-MCNC: 165 MG/DL — HIGH (ref 70–99)
GLUCOSE BLDC GLUCOMTR-MCNC: 177 MG/DL — HIGH (ref 70–99)
GLUCOSE BLDC GLUCOMTR-MCNC: 178 MG/DL — HIGH (ref 70–99)
GLUCOSE BLDC GLUCOMTR-MCNC: 180 MG/DL — HIGH (ref 70–99)
GLUCOSE BLDC GLUCOMTR-MCNC: 198 MG/DL — HIGH (ref 70–99)
GLUCOSE BLDC GLUCOMTR-MCNC: 216 MG/DL — HIGH (ref 70–99)
GLUCOSE SERPL-MCNC: 196 MG/DL — HIGH (ref 70–99)
HCT VFR BLD CALC: 50.8 % — HIGH (ref 39–50)
HGB BLD-MCNC: 16.2 G/DL — SIGNIFICANT CHANGE UP (ref 13–17)
MAGNESIUM SERPL-MCNC: 1.8 MG/DL — SIGNIFICANT CHANGE UP (ref 1.6–2.6)
MCHC RBC-ENTMCNC: 26.1 PG — LOW (ref 27–34)
MCHC RBC-ENTMCNC: 31.9 GM/DL — LOW (ref 32–36)
MCV RBC AUTO: 81.9 FL — SIGNIFICANT CHANGE UP (ref 80–100)
NRBC # BLD: 0 /100 WBCS — SIGNIFICANT CHANGE UP (ref 0–0)
NRBC # FLD: 0 K/UL — SIGNIFICANT CHANGE UP (ref 0–0)
PHOSPHATE SERPL-MCNC: 2.8 MG/DL — SIGNIFICANT CHANGE UP (ref 2.5–4.5)
PLATELET # BLD AUTO: 228 K/UL — SIGNIFICANT CHANGE UP (ref 150–400)
POTASSIUM SERPL-MCNC: 3.4 MMOL/L — LOW (ref 3.5–5.3)
POTASSIUM SERPL-SCNC: 3.4 MMOL/L — LOW (ref 3.5–5.3)
RBC # BLD: 6.2 M/UL — HIGH (ref 4.2–5.8)
RBC # FLD: 13.3 % — SIGNIFICANT CHANGE UP (ref 10.3–14.5)
SODIUM SERPL-SCNC: 135 MMOL/L — SIGNIFICANT CHANGE UP (ref 135–145)
WBC # BLD: 8.82 K/UL — SIGNIFICANT CHANGE UP (ref 3.8–10.5)
WBC # FLD AUTO: 8.82 K/UL — SIGNIFICANT CHANGE UP (ref 3.8–10.5)

## 2023-03-05 PROCEDURE — 99232 SBSQ HOSP IP/OBS MODERATE 35: CPT

## 2023-03-05 RX ORDER — POTASSIUM CHLORIDE 20 MEQ
40 PACKET (EA) ORAL ONCE
Refills: 0 | Status: COMPLETED | OUTPATIENT
Start: 2023-03-05 | End: 2023-03-05

## 2023-03-05 RX ORDER — MAGNESIUM SULFATE 500 MG/ML
1 VIAL (ML) INJECTION ONCE
Refills: 0 | Status: COMPLETED | OUTPATIENT
Start: 2023-03-05 | End: 2023-03-05

## 2023-03-05 RX ORDER — INSULIN GLARGINE 100 [IU]/ML
18 INJECTION, SOLUTION SUBCUTANEOUS AT BEDTIME
Refills: 0 | Status: DISCONTINUED | OUTPATIENT
Start: 2023-03-05 | End: 2023-03-06

## 2023-03-05 RX ADMIN — ENOXAPARIN SODIUM 40 MILLIGRAM(S): 100 INJECTION SUBCUTANEOUS at 19:19

## 2023-03-05 RX ADMIN — Medication 81 MILLIGRAM(S): at 12:47

## 2023-03-05 RX ADMIN — LISINOPRIL 20 MILLIGRAM(S): 2.5 TABLET ORAL at 05:37

## 2023-03-05 RX ADMIN — Medication 3 MILLIGRAM(S): at 21:33

## 2023-03-05 RX ADMIN — Medication 2: at 10:45

## 2023-03-05 RX ADMIN — Medication 2: at 19:18

## 2023-03-05 RX ADMIN — ATORVASTATIN CALCIUM 20 MILLIGRAM(S): 80 TABLET, FILM COATED ORAL at 21:34

## 2023-03-05 RX ADMIN — Medication 60 MILLIGRAM(S): at 05:37

## 2023-03-05 RX ADMIN — Medication 1 PACKET(S): at 12:46

## 2023-03-05 RX ADMIN — Medication 4: at 13:35

## 2023-03-05 RX ADMIN — Medication 100 GRAM(S): at 12:48

## 2023-03-05 RX ADMIN — INSULIN GLARGINE 18 UNIT(S): 100 INJECTION, SOLUTION SUBCUTANEOUS at 22:40

## 2023-03-05 RX ADMIN — Medication 40 MILLIEQUIVALENT(S): at 12:47

## 2023-03-05 NOTE — CHART NOTE - NSCHARTNOTEFT_GEN_A_CORE
Patient with normal nuclear stress test and TTE.  No further cardiology workup.  Can be discharged from cardiology perspective.    Cardiology will signoff at this time, please call with any questions.

## 2023-03-06 ENCOUNTER — TRANSCRIPTION ENCOUNTER (OUTPATIENT)
Age: 53
End: 2023-03-06

## 2023-03-06 VITALS
TEMPERATURE: 98 F | HEART RATE: 96 BPM | RESPIRATION RATE: 18 BRPM | OXYGEN SATURATION: 100 % | SYSTOLIC BLOOD PRESSURE: 132 MMHG | DIASTOLIC BLOOD PRESSURE: 82 MMHG

## 2023-03-06 LAB
ANION GAP SERPL CALC-SCNC: 14 MMOL/L — SIGNIFICANT CHANGE UP (ref 7–14)
BUN SERPL-MCNC: 18 MG/DL — SIGNIFICANT CHANGE UP (ref 7–23)
CALCIUM SERPL-MCNC: 9.8 MG/DL — SIGNIFICANT CHANGE UP (ref 8.4–10.5)
CHLORIDE SERPL-SCNC: 98 MMOL/L — SIGNIFICANT CHANGE UP (ref 98–107)
CO2 SERPL-SCNC: 25 MMOL/L — SIGNIFICANT CHANGE UP (ref 22–31)
CREAT SERPL-MCNC: 1.24 MG/DL — SIGNIFICANT CHANGE UP (ref 0.5–1.3)
EGFR: 70 ML/MIN/1.73M2 — SIGNIFICANT CHANGE UP
GLUCOSE BLDC GLUCOMTR-MCNC: 190 MG/DL — HIGH (ref 70–99)
GLUCOSE SERPL-MCNC: 205 MG/DL — HIGH (ref 70–99)
HCT VFR BLD CALC: 54.8 % — HIGH (ref 39–50)
HGB BLD-MCNC: 17.5 G/DL — HIGH (ref 13–17)
MAGNESIUM SERPL-MCNC: 2.1 MG/DL — SIGNIFICANT CHANGE UP (ref 1.6–2.6)
MCHC RBC-ENTMCNC: 26.6 PG — LOW (ref 27–34)
MCHC RBC-ENTMCNC: 31.9 GM/DL — LOW (ref 32–36)
MCV RBC AUTO: 83.3 FL — SIGNIFICANT CHANGE UP (ref 80–100)
NRBC # BLD: 0 /100 WBCS — SIGNIFICANT CHANGE UP (ref 0–0)
NRBC # FLD: 0 K/UL — SIGNIFICANT CHANGE UP (ref 0–0)
PHOSPHATE SERPL-MCNC: 3.1 MG/DL — SIGNIFICANT CHANGE UP (ref 2.5–4.5)
PLATELET # BLD AUTO: 249 K/UL — SIGNIFICANT CHANGE UP (ref 150–400)
POTASSIUM SERPL-MCNC: 3.6 MMOL/L — SIGNIFICANT CHANGE UP (ref 3.5–5.3)
POTASSIUM SERPL-SCNC: 3.6 MMOL/L — SIGNIFICANT CHANGE UP (ref 3.5–5.3)
RBC # BLD: 6.58 M/UL — HIGH (ref 4.2–5.8)
RBC # FLD: 13.4 % — SIGNIFICANT CHANGE UP (ref 10.3–14.5)
SODIUM SERPL-SCNC: 137 MMOL/L — SIGNIFICANT CHANGE UP (ref 135–145)
WBC # BLD: 12.67 K/UL — HIGH (ref 3.8–10.5)
WBC # FLD AUTO: 12.67 K/UL — HIGH (ref 3.8–10.5)

## 2023-03-06 PROCEDURE — 99239 HOSP IP/OBS DSCHRG MGMT >30: CPT

## 2023-03-06 RX ORDER — METFORMIN HYDROCHLORIDE 850 MG/1
1 TABLET ORAL
Qty: 60 | Refills: 0
Start: 2023-03-06 | End: 2023-04-04

## 2023-03-06 RX ORDER — INSULIN GLARGINE 100 [IU]/ML
18 INJECTION, SOLUTION SUBCUTANEOUS
Qty: 6 | Refills: 0
Start: 2023-03-06 | End: 2023-04-04

## 2023-03-06 RX ORDER — METFORMIN HYDROCHLORIDE 850 MG/1
1 TABLET ORAL
Qty: 0 | Refills: 0 | DISCHARGE

## 2023-03-06 RX ORDER — ISOPROPYL ALCOHOL, BENZOCAINE .7; .06 ML/ML; ML/ML
1 SWAB TOPICAL
Qty: 100 | Refills: 1
Start: 2023-03-06 | End: 2023-04-24

## 2023-03-06 RX ORDER — INSULIN GLARGINE 100 [IU]/ML
15 INJECTION, SOLUTION SUBCUTANEOUS
Qty: 6 | Refills: 0
Start: 2023-03-06 | End: 2023-04-04

## 2023-03-06 RX ADMIN — LISINOPRIL 20 MILLIGRAM(S): 2.5 TABLET ORAL at 05:56

## 2023-03-06 RX ADMIN — Medication 60 MILLIGRAM(S): at 05:55

## 2023-03-06 RX ADMIN — Medication 2: at 13:17

## 2023-03-06 RX ADMIN — Medication 2: at 09:36

## 2023-03-06 RX ADMIN — Medication 81 MILLIGRAM(S): at 12:04

## 2023-03-06 NOTE — PROGRESS NOTE ADULT - PROBLEM SELECTOR PLAN 3
- home meds: HCTZ-lisinopril 25-20mg daily, nifedipine 60mg daily  - HTN to SBP 200s on arrival, now improved after 10mg IV labetalol  - has not taken home meds in a few days, likely contributing to elevated BP  - c/w home meds  - BP improved
- home meds: HCTZ-lisinopril 25-20mg daily, nifedipine 60mg daily  - HTN to SBP 200s on arrival, now improved after 10mg IV labetalol  - has not taken home meds in a few days, likely contributing to elevated BP  - c/w home meds
- home meds: HCTZ-lisinopril 25-20mg daily, nifedipine 60mg daily  - HTN to SBP 200s on arrival, now improved after 10mg IV labetalol  - has not taken home meds in a few days, likely contributing to elevated BP  - c/w home meds

## 2023-03-06 NOTE — PROGRESS NOTE ADULT - SUBJECTIVE AND OBJECTIVE BOX
The patient is a 51 yo M/F with PMH of HTN, HLD, Type 2 DM admitted with chest pain. Endocrinology was consulted for management of diabetes mellitus.    #Type 2 Diabetes Mellitus  - HbA1c: 11%  - home regimen: non compliant with metformin and Jardiance  - Weight: 117.4 kg     Recommendations:   -Glucose target: 100-180mg/dl: slightly above goal this am and at lunch today   - Increase Lantus to 18 units sq QHS  - hold home PO meds  - Hold standing premeal for now   - Continue  moderate dose lispro correction scale TIDQAC and moderate dose lispro correction scale at QHS  - Please check FSG before meals and QHS, or q6h while NPO  - RD consult  - hypoglycemia orderset prn  - extensively discussed importance of glycemic control to prevent micro- and macrovascular complications  - please have RN complete insulin teaching for discharge     Discharge planning:  Would benefit from addition of basal insulin in addition to oral agents.   Recommend Metformin 1000 mg BID + Jardiance 25 mg daily + basal insulin doses TBDS    Will need insulin teaching, supplies, glucometer, insulin Rx at discharge.   Please send Lantus solostar pen as test script to check insurance coverage.  Ok to send with current doses and update prior to d/c  If Lantus not covered- can try alternating with one of following   tresiba/basaglar/toujeo/Levemir  Ensure patient has working glucometer, test strips, lancets, alcohol pads, and BD edward pen needles  Please also prescribe glucose tabs, Baqsimi nasal spray or glucagon emergency kit for hypoglycemia risk     Pt should follow up with Endocrinology post discharge.    ENDOCRINE FOLLOW UP  CALL PATIENT ACCESS SERVICES  [phone]  For all [facility] Endocrine Practices phone numbers and locations throughout [facility], and [place].      If patient wishes to follow up with [facility] Endocrinology at [place]  [facility]  [address] [place] [address], [address], [place] [address]  [phone]   Please call to schedule appointment with CDE/Nutritionist and MD appointment next available       #Hypertension  - BP goal <130/80  - On Nifedipine and HCTZ  - Defer to Primary Team     #Hyperlipidemia  -LDL goal <70   -Reports non compliance with statin.   -Should continue on statin at discharge   -Obtain lipid profile as an outpt     #Metabolic Acidosis  -Low risk for DKA; Received Lantus   -Trend Anion gap   -Resolved        MEDICATIONS  (STANDING):  aspirin  chewable 81 milliGRAM(s) Oral daily  atorvastatin 20 milliGRAM(s) Oral at bedtime  dextrose 5%. 1000 milliLiter(s) (100 mL/Hr) IV Continuous <Continuous>  dextrose 5%. 1000 milliLiter(s) (50 mL/Hr) IV Continuous <Continuous>  dextrose 50% Injectable 25 Gram(s) IV Push once  dextrose 50% Injectable 12.5 Gram(s) IV Push once  dextrose 50% Injectable 25 Gram(s) IV Push once  enoxaparin Injectable 40 milliGRAM(s) SubCutaneous every 24 hours  glucagon  Injectable 1 milliGRAM(s) IntraMuscular once  hydrochlorothiazide 25 milliGRAM(s) Oral daily  insulin glargine Injectable (LANTUS) 18 Unit(s) SubCutaneous at bedtime  insulin lispro (ADMELOG) corrective regimen sliding scale   SubCutaneous three times a day before meals  insulin lispro (ADMELOG) corrective regimen sliding scale   SubCutaneous at bedtime  lisinopril 20 milliGRAM(s) Oral daily  melatonin 3 milliGRAM(s) Oral at bedtime  NIFEdipine XL 60 milliGRAM(s) Oral daily    MEDICATIONS  (PRN):  acetaminophen     Tablet .. 650 milliGRAM(s) Oral every 6 hours PRN Temp greater or equal to 38C (100.4F), Mild Pain (1 - 3)  aluminum hydroxide/magnesium hydroxide/simethicone Suspension 30 milliLiter(s) Oral every 4 hours PRN Dyspepsia  dextrose Oral Gel 15 Gram(s) Oral once PRN Blood Glucose LESS THAN 70 milliGRAM(s)/deciliter  ondansetron Injectable 4 milliGRAM(s) IV Push every 8 hours PRN Nausea and/or Vomiting      Allergies: No Known Allergies    Intolerances: fish (Stomach Upset)  Seafood (Stomach Upset)    Review of Systems:  Respiratory: No SOB, no cough  GI: No nausea, vomiting, abdominal pain  Endocrine: no polyuria, polydipsia    PHYSICAL EXAM:  VITALS: T(C): 36.3 (03-05-23 @ 12:29)  T(F): 97.3 (03-05-23 @ 12:29), Max: 98.5 (03-05-23 @ 05:35)  HR: 99 (03-05-23 @ 12:29) (85 - 99)  BP: 122/80 (03-05-23 @ 12:29) (122/80 - 136/85)  RR:  (16 - 18)  SpO2:  (99% - 100%)  Wt(kg): --  GENERAL: NAD, well-groomed, well-developed  RESPIRATORY: No labored breathing   GI: Soft, nontender, non distended  PSYCH: Alert and oriented x 3, normal affect, normal mood      CAPILLARY BLOOD GLUCOSE  POCT Blood Glucose.: 165 mg/dL (05 Mar 2023 19:10)  POCT Blood Glucose.: 177 mg/dL (05 Mar 2023 16:46)  POCT Blood Glucose.: 216 mg/dL (05 Mar 2023 13:01)  POCT Blood Glucose.: 198 mg/dL (05 Mar 2023 10:35)  POCT Blood Glucose.: 180 mg/dL (05 Mar 2023 09:21)  POCT Blood Glucose.: 188 mg/dL (04 Mar 2023 22:18)    A1C with Estimated Average Glucose (03.03.23 @ 01:15)    A1C with Estimated Average Glucose Result: 11.0: High Risk (prediabetic)    5.7 - 6.4 %  Diabetic, diagnostic           > 6.5 %  ADA diabetic treatment goal    < 7.0 %  HbA1C values may not accurately reflect mean blood glucose in patients  with Hb variants.  Suggest clinical correlation. %    Estimated Average Glucose: 269      03-05    135  |  100  |  15  ----------------------------<  196<H>  3.4<L>   |  23  |  1.06    eGFR: 84    Ca    9.1      03-05  Mg     1.80     03-05  Phos  2.8     03-05    TPro  7.5  /  Alb  4.2  /  TBili  0.3  /  DBili  x   /  AST  24  /  ALT  39  /  AlkPhos  85  03-03    Thyroid Function Tests:  03-03 @ 03:17 TSH 1.99 FreeT4 -- T3 -- Anti TPO -- Anti Thyroglobulin Ab -- TSI --      Diet, Regular:   Consistent Carbohydrate Evening Snack (CSTCHOSN)  DASH/TLC Sodium & Cholesterol Restricted (DASH)  Low Sodium  No Caffeine  No Carbonated Beverages  No Chocolate (03-03-23 @ 09:14) [Active]      Isela Encarnacion  Nurse Practitioner  Division of Endocrinology & Diabetes  In house pager #[phone]    If before 9AM or after 6PM, or on weekends/holidays, please call endocrine answering service for assistance ([phone]).For nonurgent matters email [email or web address] for assistance.                 
Medicine Progress Note    Patient is a 52y old  Male who presents with a chief complaint of Chest pain (03 Mar 2023 15:33)      SUBJECTIVE / OVERNIGHT EVENTS: chest pain resolved , pending nuclear stress test         MEDICATIONS  (STANDING):  aspirin  chewable 81 milliGRAM(s) Oral daily  atorvastatin 20 milliGRAM(s) Oral at bedtime  dextrose 5%. 1000 milliLiter(s) (100 mL/Hr) IV Continuous <Continuous>  dextrose 5%. 1000 milliLiter(s) (50 mL/Hr) IV Continuous <Continuous>  dextrose 50% Injectable 25 Gram(s) IV Push once  dextrose 50% Injectable 12.5 Gram(s) IV Push once  dextrose 50% Injectable 25 Gram(s) IV Push once  enoxaparin Injectable 40 milliGRAM(s) SubCutaneous every 24 hours  glucagon  Injectable 1 milliGRAM(s) IntraMuscular once  hydrochlorothiazide 25 milliGRAM(s) Oral daily  insulin glargine Injectable (LANTUS) 12 Unit(s) SubCutaneous at bedtime  insulin lispro (ADMELOG) corrective regimen sliding scale   SubCutaneous three times a day before meals  insulin lispro (ADMELOG) corrective regimen sliding scale   SubCutaneous at bedtime  lisinopril 20 milliGRAM(s) Oral daily  melatonin 3 milliGRAM(s) Oral at bedtime  NIFEdipine XL 60 milliGRAM(s) Oral daily  potassium chloride    Tablet ER 40 milliEquivalent(s) Oral every 4 hours  potassium phosphate / sodium phosphate Powder (PHOS-NaK) 1 Packet(s) Oral once    MEDICATIONS  (PRN):  acetaminophen     Tablet .. 650 milliGRAM(s) Oral every 6 hours PRN Temp greater or equal to 38C (100.4F), Mild Pain (1 - 3)  aluminum hydroxide/magnesium hydroxide/simethicone Suspension 30 milliLiter(s) Oral every 4 hours PRN Dyspepsia  dextrose Oral Gel 15 Gram(s) Oral once PRN Blood Glucose LESS THAN 70 milliGRAM(s)/deciliter  ondansetron Injectable 4 milliGRAM(s) IV Push every 8 hours PRN Nausea and/or Vomiting    CAPILLARY BLOOD GLUCOSE      POCT Blood Glucose.: 206 mg/dL (04 Mar 2023 12:46)  POCT Blood Glucose.: 215 mg/dL (04 Mar 2023 09:02)  POCT Blood Glucose.: 176 mg/dL (03 Mar 2023 22:28)  POCT Blood Glucose.: 170 mg/dL (03 Mar 2023 18:14)  POCT Blood Glucose.: 176 mg/dL (03 Mar 2023 17:20)  POCT Blood Glucose.: 181 mg/dL (03 Mar 2023 15:02)    I&O's Summary      PHYSICAL EXAM:  Vital Signs Last 24 Hrs  T(C): 36.5 (04 Mar 2023 10:16), Max: 36.6 (03 Mar 2023 15:02)  T(F): 97.7 (04 Mar 2023 10:16), Max: 97.9 (03 Mar 2023 18:24)  HR: 91 (04 Mar 2023 10:16) (68 - 100)  BP: 146/85 (04 Mar 2023 10:16) (123/83 - 184/91)  BP(mean): --  RR: 16 (04 Mar 2023 10:16) (16 - 19)  SpO2: 100% (04 Mar 2023 10:16) (97% - 100%)    Parameters below as of 04 Mar 2023 10:16  Patient On (Oxygen Delivery Method): room air      CONSTITUTIONAL: NAD,   ENMT: Moist oral mucosa, no pharyngeal injection or exudates;  RESPIRATORY: Normal respiratory effort; lungs are clear to auscultation bilaterally  CARDIOVASCULAR: Regular rate and rhythm, normal S1 and S2, no murmur/rub/gallop; No lower extremity edema;   ABDOMEN: Nontender to palpation, normoactive bowel sounds, no rebound/guarding;  PSYCH: A+O to person, place, and time; affect appropriate  NEUROLOGY: CN 2-12 are intact and symmetric; no gross sensory deficits   SKIN: No rashes; no palpable lesions    LABS:                        16.4   10.99 )-----------( 241      ( 04 Mar 2023 06:00 )             52.3     03-04    138  |  100  |  13  ----------------------------<  216<H>  3.1<L>   |  23  |  0.97    Ca    9.3      04 Mar 2023 06:00  Phos  2.6     03-04  Mg     2.00     03-04    TPro  7.5  /  Alb  4.2  /  TBili  0.3  /  DBili  x   /  AST  24  /  ALT  39  /  AlkPhos  85  03-03    PT/INR - ( 03 Mar 2023 01:15 )   PT: 12.5 sec;   INR: 1.08 ratio         PTT - ( 03 Mar 2023 01:15 )  PTT:30.6 sec  CARDIAC MARKERS ( 03 Mar 2023 03:17 )  x     / x     / x     / x     / 1.6 ng/mL              RADIOLOGY & ADDITIONAL TESTS:  Imaging from Last 24 Hours:    Electrocardiogram/QTc Interval:    COORDINATION OF CARE:  Care Discussed with Consultants/Other Providers:  
[facility] Division of Hospital Medicine  Neeru Tavares MD  Pager (M-F, 8A-5P): [phone]  Other Times:  [phone]    Patient is a 52y old  Male who presents with a chief complaint of Chest pain (05 Mar 2023 17:00)      SUBJECTIVE / OVERNIGHT EVENTS: NO acute events ON; denies any CP/SOB        MEDICATIONS  (STANDING):  aspirin  chewable 81 milliGRAM(s) Oral daily  atorvastatin 20 milliGRAM(s) Oral at bedtime  dextrose 5%. 1000 milliLiter(s) (100 mL/Hr) IV Continuous <Continuous>  dextrose 5%. 1000 milliLiter(s) (50 mL/Hr) IV Continuous <Continuous>  dextrose 50% Injectable 25 Gram(s) IV Push once  dextrose 50% Injectable 12.5 Gram(s) IV Push once  dextrose 50% Injectable 25 Gram(s) IV Push once  enoxaparin Injectable 40 milliGRAM(s) SubCutaneous every 24 hours  glucagon  Injectable 1 milliGRAM(s) IntraMuscular once  hydrochlorothiazide 25 milliGRAM(s) Oral daily  insulin glargine Injectable (LANTUS) 18 Unit(s) SubCutaneous at bedtime  insulin lispro (ADMELOG) corrective regimen sliding scale   SubCutaneous three times a day before meals  insulin lispro (ADMELOG) corrective regimen sliding scale   SubCutaneous at bedtime  lisinopril 20 milliGRAM(s) Oral daily  melatonin 3 milliGRAM(s) Oral at bedtime  NIFEdipine XL 60 milliGRAM(s) Oral daily    MEDICATIONS  (PRN):  acetaminophen     Tablet .. 650 milliGRAM(s) Oral every 6 hours PRN Temp greater or equal to 38C (100.4F), Mild Pain (1 - 3)  aluminum hydroxide/magnesium hydroxide/simethicone Suspension 30 milliLiter(s) Oral every 4 hours PRN Dyspepsia  dextrose Oral Gel 15 Gram(s) Oral once PRN Blood Glucose LESS THAN 70 milliGRAM(s)/deciliter  ondansetron Injectable 4 milliGRAM(s) IV Push every 8 hours PRN Nausea and/or Vomiting      CAPILLARY BLOOD GLUCOSE      POCT Blood Glucose.: 190 mg/dL (06 Mar 2023 08:51)  POCT Blood Glucose.: 178 mg/dL (05 Mar 2023 22:23)  POCT Blood Glucose.: 165 mg/dL (05 Mar 2023 19:10)  POCT Blood Glucose.: 177 mg/dL (05 Mar 2023 16:46)  POCT Blood Glucose.: 216 mg/dL (05 Mar 2023 13:01)    I&O's Summary      PHYSICAL EXAM:  Vital Signs Last 24 Hrs  T(C): 36.9 (06 Mar 2023 05:54), Max: 36.9 (06 Mar 2023 05:54)  T(F): 98.5 (06 Mar 2023 05:54), Max: 98.5 (06 Mar 2023 05:54)  HR: 97 (06 Mar 2023 05:54) (76 - 99)  BP: 139/93 (06 Mar 2023 05:54) (122/80 - 139/93)  BP(mean): --  RR: 18 (06 Mar 2023 05:54) (16 - 18)  SpO2: 99% (06 Mar 2023 05:54) (98% - 99%)    Parameters below as of 06 Mar 2023 05:54  Patient On (Oxygen Delivery Method): room air      CONSTITUTIONAL: NAD  EYES: conjunctiva and sclera clear  RESPIRATORY: Normal respiratory effort; lungs are clear to auscultation bilaterally  CARDIOVASCULAR: Regular rate and rhythm, normal S1 and S2, no murmur/rub/gallop;   ABDOMEN: Nontender to palpation, normoactive bowel sounds, no rebound/guarding;  PSYCH: A+O to person, place, and time; affect appropriate  NEUROLOGY: CN 2-12 are intact and symmetric; no gross sensory deficits       LABS:                        17.5   12.67 )-----------( 249      ( 06 Mar 2023 07:07 )             54.8     03-06    137  |  98  |  18  ----------------------------<  205<H>  3.6   |  25  |  1.24    Ca    9.8      06 Mar 2023 07:07  Phos  3.1     03-06  Mg     2.10     03-06                  RADIOLOGY & ADDITIONAL TESTS:  Results Reviewed:   Imaging Personally Reviewed:  Electrocardiogram Personally Reviewed:    COORDINATION OF CARE:  Care Discussed with Consultants/Other Providers [Y/N]:  Prior or Outpatient Records Reviewed [Y/N]:  
Medicine Progress Note    Patient is a 52y old  Male who presents with a chief complaint of Chest pain (05 Mar 2023 08:13)      SUBJECTIVE / OVERNIGHT EVENTS: no chest pain       MEDICATIONS  (STANDING):  aspirin  chewable 81 milliGRAM(s) Oral daily  atorvastatin 20 milliGRAM(s) Oral at bedtime  dextrose 5%. 1000 milliLiter(s) (50 mL/Hr) IV Continuous <Continuous>  dextrose 5%. 1000 milliLiter(s) (100 mL/Hr) IV Continuous <Continuous>  dextrose 50% Injectable 25 Gram(s) IV Push once  dextrose 50% Injectable 12.5 Gram(s) IV Push once  dextrose 50% Injectable 25 Gram(s) IV Push once  enoxaparin Injectable 40 milliGRAM(s) SubCutaneous every 24 hours  glucagon  Injectable 1 milliGRAM(s) IntraMuscular once  hydrochlorothiazide 25 milliGRAM(s) Oral daily  insulin glargine Injectable (LANTUS) 18 Unit(s) SubCutaneous at bedtime  insulin lispro (ADMELOG) corrective regimen sliding scale   SubCutaneous three times a day before meals  insulin lispro (ADMELOG) corrective regimen sliding scale   SubCutaneous at bedtime  lisinopril 20 milliGRAM(s) Oral daily  melatonin 3 milliGRAM(s) Oral at bedtime  NIFEdipine XL 60 milliGRAM(s) Oral daily    MEDICATIONS  (PRN):  acetaminophen     Tablet .. 650 milliGRAM(s) Oral every 6 hours PRN Temp greater or equal to 38C (100.4F), Mild Pain (1 - 3)  aluminum hydroxide/magnesium hydroxide/simethicone Suspension 30 milliLiter(s) Oral every 4 hours PRN Dyspepsia  dextrose Oral Gel 15 Gram(s) Oral once PRN Blood Glucose LESS THAN 70 milliGRAM(s)/deciliter  ondansetron Injectable 4 milliGRAM(s) IV Push every 8 hours PRN Nausea and/or Vomiting    CAPILLARY BLOOD GLUCOSE      POCT Blood Glucose.: 165 mg/dL (05 Mar 2023 19:10)  POCT Blood Glucose.: 177 mg/dL (05 Mar 2023 16:46)  POCT Blood Glucose.: 216 mg/dL (05 Mar 2023 13:01)  POCT Blood Glucose.: 198 mg/dL (05 Mar 2023 10:35)  POCT Blood Glucose.: 180 mg/dL (05 Mar 2023 09:21)  POCT Blood Glucose.: 188 mg/dL (04 Mar 2023 22:18)    I&O's Summary    04 Mar 2023 07:01  -  05 Mar 2023 07:00  --------------------------------------------------------  IN: 1110 mL / OUT: 0 mL / NET: 1110 mL        PHYSICAL EXAM:  Vital Signs Last 24 Hrs  T(C): 36.3 (05 Mar 2023 12:29), Max: 36.9 (05 Mar 2023 05:35)  T(F): 97.3 (05 Mar 2023 12:29), Max: 98.5 (05 Mar 2023 05:35)  HR: 99 (05 Mar 2023 12:29) (85 - 99)  BP: 122/80 (05 Mar 2023 12:29) (122/80 - 136/85)  BP(mean): --  RR: 16 (05 Mar 2023 12:29) (16 - 18)  SpO2: 99% (05 Mar 2023 12:29) (99% - 100%)    Parameters below as of 05 Mar 2023 12:29  Patient On (Oxygen Delivery Method): room air      CONSTITUTIONAL: NAD,  ENMT: Moist oral mucosa, no pharyngeal injection or exudates; normal dentition  RESPIRATORY: Normal respiratory effort; lungs are clear to auscultation bilaterally  CARDIOVASCULAR: Regular rate and rhythm, normal S1 and S2, ; No lower extremity edema;  ABDOMEN: Nontender to palpation, normoactive bowel sounds, no rebound/guarding;   PSYCH: A+O to person, place, and time; affect appropriate  NEUROLOGY: CN 2-12 are intact and symmetric; no gross sensory deficits   SKIN: No rashes; no palpable lesions    LABS:                        16.2   8.82  )-----------( 228      ( 05 Mar 2023 06:33 )             50.8     03-05    135  |  100  |  15  ----------------------------<  196<H>  3.4<L>   |  23  |  1.06    Ca    9.1      05 Mar 2023 06:33  Phos  2.8     03-05  Mg     1.80     03-05                    RADIOLOGY & ADDITIONAL TESTS:  Imaging from Last 24 Hours:    Electrocardiogram/QTc Interval:    COORDINATION OF CARE:  Care Discussed with Consultants/Other Providers:

## 2023-03-06 NOTE — CHART NOTE - NSCHARTNOTEFT_GEN_A_CORE
The patient is a 51 yo M/F with PMH of HTN, HLD, Type 2 DM admitted with chest pain. Endocrinology was consulted for management of diabetes mellitus.    #Type 2 Diabetes Mellitus  - HbA1c: 11%  - home regimen: non compliant with metformin and Jardiance  - Weight: 117.4 kg     Recommendations:   -Glucose target: 100-180mg/dl: slightly above goal this am and at lunch today   - Continue Lantus to 18 units sq QHS  - hold home PO meds  - Hold standing premeal for now   - Continue  moderate dose lispro correction scale TIDQAC and moderate dose lispro correction scale at QHS  - Please check FSG before meals and QHS, or q6h while NPO  - RD consult  - hypoglycemia orderset prn  - extensively discussed importance of glycemic control to prevent micro- and macrovascular complications  - please have RN complete insulin teaching for discharge     Discharge planning:  Lantus solostar pen 15 units sq qhs + Metformin 1000 mg BID + Jardiance 25 mg daily   Will need insulin teaching, supplies, glucometer, insulin Rx at discharge.   Ensure patient has working glucometer, test strips, lancets, alcohol pads, and BD edward pen needles  Please also prescribe glucose tabs, Baqsimi nasal spray or glucagon emergency kit for hypoglycemia risk   Pt should follow up with Endocrinology post discharge.    ENDOCRINE FOLLOW UP  CALL PATIENT ACCESS SERVICES  [phone]  For all [facility] Endocrine Practices phone numbers and locations throughout [facility], and [place].      If patient wishes to follow up with [facility] Endocrinology at [place]  [facility]  [address] [place] [address], [address], [place] [address]  [phone]   Please call to schedule appointment with CDE/Nutritionist and MD appointment next available     Belkis Bar  Nurse Practitioner  Division of Endocrinology & Diabetes  Available via  Teams      If after 6PM or before 9AM, or on weekends/holidays, please call endocrine answering service for assistance ([phone]).  For nonurgent matters email [email or web address] for assistance.

## 2023-03-06 NOTE — PROGRESS NOTE ADULT - PROBLEM SELECTOR PLAN 1
- pt p/w acute onset CP, SOB, and diaphoresis while getting into his car to go to work; similar episode 2018 at which time ECHO was normal and LHC not performed   - pt admits to increased work stressors, possibly contributing to presentation; has not taken home meds in a few days d/t stress  - hemodynamically stable on arrival; HTN SBP 200s now improved  - CBC and CMP largely unremarkable  - trop neg x2, BNP neg  - EKG NSR without acute ischemic changes; LVH and TWI laterally similar to prior  - s/p ASA 325mg in ER  - cardiology consulted, recommending NST, TTE, lipid/A1C/TSH, daily ASA  - c/w ASA 81mg daily, atorvastatin 20mg daily  - f/u NST, TTE  - check lipid, A1C, TSH  - continue to monitor on tele
- Likely demand ischemia in setting of poorly controlled BP   - pt p/w acute onset CP, SOB, and diaphoresis while getting into his car to go to work; similar episode 2018 at which time ECHO was normal and LHC not performed   - pt admits to increased work stressors, possibly contributing to presentation; has not taken home meds in a few days d/t stress  - hemodynamically stable on arrival; HTN -130  - CBC and CMP largely unremarkable  - trop neg x2, BNP neg  - EKG NSR without acute ischemic changes; LVH and TWI laterally similar to prior  - s/p ASA 325mg in ER;   - TTE normal EF and no WMA + LVH  - cardiology consulted LDL-100/A1C-11/TSH- WNL  - c/w ASA 81mg daily, atorvastatin 20mg daily  - NST- neg for MI or ischemia  - outpt cards f/u
- pt p/w acute onset CP, SOB, and diaphoresis while getting into his car to go to work; similar episode 2018 at which time ECHO was normal and LHC not performed   - pt admits to increased work stressors, possibly contributing to presentation; has not taken home meds in a few days d/t stress  - hemodynamically stable on arrival; HTN SBP 200s now improved  - CBC and CMP largely unremarkable  - trop neg x2, BNP neg  - EKG NSR without acute ischemic changes; LVH and TWI laterally similar to prior  - s/p ASA 325mg in ER  - cardiology consulted, recommending NST, TTE, lipid/A1C/TSH, daily ASA  - c/w ASA 81mg daily, atorvastatin 20mg daily  - f/u NST, TTE  - check lipid, A1C, TSH  - continue to monitor on tele

## 2023-03-06 NOTE — PROGRESS NOTE ADULT - PROBLEM SELECTOR PLAN 7
- F: none  - E: replete , goal potassium >4 mag >2   - N: DASH/TLC, carb consistent  - D: lovenox 40mg q24h  - G: none    code: full  dispo: Home discharge 40 min
- F: none  - E: replete , goal potassium >4 mag >2   - N: DASH/TLC, carb consistent  - D: lovenox 40mg q24h  - G: none    code: full  dispo: pending medical optimization, anticipate return to home
- F: none  - E: replete , goal potassium >4 mag >2   - N: DASH/TLC, carb consistent  - D: lovenox 40mg q24h  - G: none    code: full  dispo: pending medical optimization, anticipate return to home

## 2023-03-06 NOTE — DISCHARGE NOTE NURSING/CASE MANAGEMENT/SOCIAL WORK - WAS YOUR LAST COVID-19 VACCINE GREATER THAN OR EQUAL TO TWO MONTHS AGO?
Bed: 49  Expected date:   Expected time:   Means of arrival:   Comments:  NOMAN  
Pt axox4. Pt given discharge instructions. Pt encouraged to follow up with consults within 1-2 days. Pt verbalized understanding. Pt denies any further questions. Pt encouraged to come back to the ER for worsening symptoms. Pt walking with a steady gait.   
Yes

## 2023-03-06 NOTE — PROGRESS NOTE ADULT - PROBLEM SELECTOR PLAN 2
- lactate 2.1 on arrival, still anion gap acidosis , pos lactic acidosis   repeat VBG  - warm and well perfused on exam; not meeting SIRS criteria  - likely demand in setting of poor PO intake and lack of home meds with HTN   - will not continue to trend  - repeat for any change to hemodynamics or exam
- lactate 2.1 on arrival, still anion gap acidosis , pos lactic acidosis   repeat VBG  - warm and well perfused on exam; not meeting SIRS criteria  - likely demand in setting of poor PO intake and lack of home meds with HTN   - will not continue to trend  - repeat for any change to hemodynamics or exam
- home meds: jardiance 25mg daily, metformin 1g BID  - mISS while inpt  - check A1C is 11   - uncontrolled DM   - carb consistent diet  - nutrition consult   - C/w Lantus and ISS , titrate -180  f/u endocrine for discharge recs  - outpt endocrine f/u

## 2023-03-06 NOTE — PROGRESS NOTE ADULT - PROBLEM SELECTOR PLAN 4
- lactate 2.1 on arrival, still anion gap acidosis , pos lactic acidosis   - warm and well perfused on exam; not meeting SIRS criteria  - likely demand in setting of poor PO intake and lack of home meds with HTN   - will not continue to trend  - repeat for any change to hemodynamics or exam  - BP improved after home meds restarted
- home med: atorvastatin 20mg daily  - c/w home med
- home med: atorvastatin 20mg daily  - c/w home med

## 2023-03-06 NOTE — DISCHARGE NOTE NURSING/CASE MANAGEMENT/SOCIAL WORK - NSDCPNINST_GEN_ALL_CORE
Patient to return to emergency department for unresolved shortness or breath, pain, fever, nausea, vomiting, or diarrhea.

## 2023-03-06 NOTE — DISCHARGE NOTE NURSING/CASE MANAGEMENT/SOCIAL WORK - NSDCPEFALRISK_GEN_ALL_CORE
For information on Fall & Injury Prevention, visit: [email or web address] OR  [email or web address] OR  https://www.cdc.gov/steadi/patient.html

## 2023-03-06 NOTE — DISCHARGE NOTE NURSING/CASE MANAGEMENT/SOCIAL WORK - PATIENT PORTAL LINK FT
You can access the FollowMyHealth Patient Portal offered by [facility] by registering at the following website: [email or web address]. By joining [facility]’s FollowMyHealth portal, you will also be able to view your health information using other applications (apps) compatible with our system.

## 2023-03-06 NOTE — PHARMACOTHERAPY INTERVENTION NOTE - COMMENTS
Patient educated on A1c, insulin pen administration, hypoglycemia and treatment and healthy plate. Taught patient how to inject Lantus using demo pad and skin pad, patient with good return demonstration. Also reviewed diet and lifestyle changes. He works overnights and admits to eating fast food often. Suggested meal prepping at home and packing meals for work. Patient also admits to medication nonadherence due to stress, suggested setting alarms on his phone or using the medication lara for reminders.  Patient questions and concerns were answered and addressed. Patient demonstrated understanding. Informed patient that medication list may change prior to discharge. Patient provided with educational handout and encouraged for outpt f/u with medicine and endocrine.     Isabelle Zee, PharmD, Clinical Pharmacy Specialist, [phone]

## 2023-03-06 NOTE — PROGRESS NOTE ADULT - ASSESSMENT
Pt is a 53 yo M with PMH HTN, HLD, T2D, and CAD p/w acute onset CP and SOB x1d. EKG NSR without acute ischemic changes. Cardiology following with recommendation for NST and TTE.

## 2023-03-06 NOTE — PROGRESS NOTE ADULT - PROBLEM SELECTOR PLAN 6
- home meds: jardiance 25mg daily, metformin 1g BID  - mISS while inpt  - check A1C is 11   - uncontrolled DM   - carb consistent diet  - nutrition consult   C/w Lantus and MERCED , titrate -180  Will email endo for consult .
- home med: atorvastatin 20mg daily  - c/w home med
- home meds: jardiance 25mg daily, metformin 1g BID  - mISS while inpt  - check A1C is 11   - uncontrolled DM   - carb consistent diet  - nutrition consult   C/w Lantus and MERCED , titrate -180  Will email endo for consult .

## 2023-03-08 PROBLEM — I25.10 ATHEROSCLEROTIC HEART DISEASE OF NATIVE CORONARY ARTERY WITHOUT ANGINA PECTORIS: Chronic | Status: ACTIVE | Noted: 2023-03-03

## 2023-03-08 PROBLEM — E78.5 HYPERLIPIDEMIA, UNSPECIFIED: Chronic | Status: ACTIVE | Noted: 2023-03-03

## 2023-03-08 PROBLEM — E11.9 TYPE 2 DIABETES MELLITUS WITHOUT COMPLICATIONS: Chronic | Status: ACTIVE | Noted: 2023-03-03

## 2023-03-10 NOTE — CHART NOTE - NSCHARTNOTEFT_GEN_A_CORE
Post-Discharge Medication Review    Three attempts were made to contact patient to offer medication counseling post-discharge. Patient could not be reached, and medication counseling could not be completed.

## 2023-04-08 NOTE — PROGRESS NOTE ADULT - PROBLEM/PLAN-3
Pt arrives to ER via private veichle. Pt reports falling outside a couple days ago landing on his side, reports ribs hurt from that fall. Pt reports this morning falling down a flight of stairs and landing on his L shoulder.  Pt rates pain 7/10.      
DISPLAY PLAN FREE TEXT

## 2023-05-09 PROBLEM — Z00.00 ENCOUNTER FOR PREVENTIVE HEALTH EXAMINATION: Status: ACTIVE | Noted: 2023-05-09

## 2023-07-27 ENCOUNTER — APPOINTMENT (OUTPATIENT)
Dept: ENDOCRINOLOGY | Facility: CLINIC | Age: 53
End: 2023-07-27

## 2024-01-08 ENCOUNTER — APPOINTMENT (OUTPATIENT)
Age: 54
End: 2024-01-08
Payer: COMMERCIAL

## 2024-01-08 PROCEDURE — D9310: CPT

## 2024-01-08 PROCEDURE — D0330 PANORAMIC RADIOGRAPHIC IMAGE: CPT

## 2024-01-11 NOTE — H&P ADULT - PROBLEM/PLAN-2
"Subjective   History was provided by the parents.  Vern Marquez is a 9 y.o. male who is here for this well-child visit.    Concerns: no concerns, finally able to get increased dose from pharmacy    School: [employer]  Grade: 3rd, most a's and b's [place], suspect dyslexia, passed state testing  Activities: karate    Nutrition, Elimination, and Sleep:  Diet:  eats well, some dairy  Elimination:  no concerns  Sleep: 8 to 9 hours    Dentist: Yes    Anticipatory Guidance:  healthy eating discussed and encouraged annual flu vaccine      /68 (BP Location: Right arm, Patient Position: Sitting, BP Cuff Size: Small adult)   Pulse 90   Ht 1.365 m (4' 5.75\")   Wt 37.5 kg   BMI 20.10 kg/m²   Vision Screening    Right eye Left eye Both eyes   Without correction   passed   With correction          Vision Screening    Right eye Left eye Both eyes   Without correction   passed   With correction            General:  Well appearing   Eyes:  Sclera clear   Mouth: Mucous membranes moist, lips, teeth, gums normal   Throat: normal   Ears: Tympanic membranes normal   Heart: Regular rate and rhythm, no murmurs   Lungs: clear   Abdomen:  soft, non-tender, no masses, no organomegaly   Back: No scoliosis   Skin: No rashes   : normal external genitalia and normal circumcised male, bilateral testes descended   Musculoskeletal: Normal muscle bulk and tone   Neuro: No focal deficits     Assessment and Plan:    1. Encounter for routine child health examination without abnormal findings        2. ADHD (attention deficit hyperactivity disorder), predominantly hyperactive impulsive type      doing well, starting 27 mg dose of concerta today          Follow up for well  in 1 year.   "
DISPLAY PLAN FREE TEXT

## 2024-03-14 ENCOUNTER — OUTPATIENT (OUTPATIENT)
Dept: OUTPATIENT SERVICES | Facility: HOSPITAL | Age: 54
LOS: 1 days | End: 2024-03-14

## 2024-03-14 VITALS
SYSTOLIC BLOOD PRESSURE: 183 MMHG | WEIGHT: 255.96 LBS | DIASTOLIC BLOOD PRESSURE: 94 MMHG | HEIGHT: 70 IN | HEART RATE: 67 BPM | TEMPERATURE: 98 F | RESPIRATION RATE: 16 BRPM | OXYGEN SATURATION: 98 %

## 2024-03-14 DIAGNOSIS — K08.409 PARTIAL LOSS OF TEETH, UNSPECIFIED CAUSE, UNSPECIFIED CLASS: Chronic | ICD-10-CM

## 2024-03-14 DIAGNOSIS — F41.0 PANIC DISORDER [EPISODIC PAROXYSMAL ANXIETY]: ICD-10-CM

## 2024-03-14 DIAGNOSIS — Z98.890 OTHER SPECIFIED POSTPROCEDURAL STATES: Chronic | ICD-10-CM

## 2024-03-14 DIAGNOSIS — I10 ESSENTIAL (PRIMARY) HYPERTENSION: ICD-10-CM

## 2024-03-14 DIAGNOSIS — E11.9 TYPE 2 DIABETES MELLITUS WITHOUT COMPLICATIONS: ICD-10-CM

## 2024-03-14 RX ORDER — SODIUM CHLORIDE 9 MG/ML
1000 INJECTION, SOLUTION INTRAVENOUS
Refills: 0 | Status: DISCONTINUED | OUTPATIENT
Start: 2024-03-18 | End: 2024-04-02

## 2024-03-14 RX ORDER — EMPAGLIFLOZIN 10 MG/1
1 TABLET, FILM COATED ORAL
Qty: 0 | Refills: 0 | DISCHARGE

## 2024-03-14 RX ORDER — LISINOPRIL/HYDROCHLOROTHIAZIDE 10-12.5 MG
1 TABLET ORAL
Qty: 0 | Refills: 0 | DISCHARGE

## 2024-03-14 RX ORDER — ATORVASTATIN CALCIUM 80 MG/1
1 TABLET, FILM COATED ORAL
Qty: 0 | Refills: 0 | DISCHARGE

## 2024-03-14 RX ORDER — NIFEDIPINE 30 MG
1 TABLET, EXTENDED RELEASE 24 HR ORAL
Qty: 0 | Refills: 0 | DISCHARGE

## 2024-03-14 NOTE — H&P PST ADULT - NSICDXPASTMEDICALHX_GEN_ALL_CORE_FT
PAST MEDICAL HISTORY:  CAD (coronary artery disease)     HLD (hyperlipidemia)     HTN (hypertension)     Panic disorder without agoraphobia     Type 2 diabetes mellitus

## 2024-03-14 NOTE — H&P PST ADULT - ATTENDING COMMENTS
Pt with severe dental phobia and unable to tolerate extractions under local and sedation in the outpatient setting

## 2024-03-14 NOTE — H&P PST ADULT - PROBLEM SELECTOR PLAN 3
pt instructed to continue medications as prescribed and take lisinopril, nifedipine with a sip of water on the morning of the surgery

## 2024-03-14 NOTE — H&P PST ADULT - LAST STRESS TEST
03/2023, nuclear stress test, normal myocardial perfusion scan, no evidence of infarction or ischemia

## 2024-03-14 NOTE — H&P PST ADULT - PROBLEM SELECTOR PLAN 2
Patient instructed to hold Metformin on the morning of procedure. Pt stated understanding.   pt instructed to hold jardiance 3 days preop, last dose 03/14/24

## 2024-03-14 NOTE — H&P PST ADULT - HISTORY OF PRESENT ILLNESS
53 y.o. male with h/o HTN, HLD, DM, panic disorder without agoraphobia, presents to PST for evaluation scheduled for extraction erupted exposed root, reports "i have cracked tooth and loose tooth", c/o intermittent tooth ache

## 2024-03-14 NOTE — H&P PST ADULT - PROBLEM SELECTOR PLAN 1
Spontaneous
pt scheduled for extraction erupted exposed root #30, 5 on 03/18/24  Preop instructions provided. Pt verbalized understanding.   Pepcid for GI prophylaxis with written and verbal instruction provided   EKG, echo, stress test in chart  labs done at PCP office, copy requested    TAN precautions. Pt with >3 criteria on STOP-Bang Questionnaire.

## 2024-03-15 NOTE — ASU PATIENT PROFILE, ADULT - PAIN CHRONIC, PROFILE
Patient is here alone today.    If any information or results need to be relayed from today's visit, the best way to contact the patient is via [phone] (mobile) - Patient gives verbal permission to leave a detailed voicemail at the number provided.       no

## 2024-03-15 NOTE — ASU PATIENT PROFILE, ADULT - NSSUBSTANCEUSE_GEN_ALL_CORE_SD
MSW received referral and chart reviewed   MSW will initiate initial assessment and DT screening   MSW will follow 
never used

## 2024-03-17 ENCOUNTER — TRANSCRIPTION ENCOUNTER (OUTPATIENT)
Age: 54
End: 2024-03-17

## 2024-03-18 ENCOUNTER — OUTPATIENT (OUTPATIENT)
Dept: OUTPATIENT SERVICES | Facility: HOSPITAL | Age: 54
LOS: 1 days | Discharge: HOME | End: 2024-03-18
Payer: COMMERCIAL

## 2024-03-18 ENCOUNTER — APPOINTMENT (OUTPATIENT)
Age: 54
End: 2024-03-18

## 2024-03-18 ENCOUNTER — TRANSCRIPTION ENCOUNTER (OUTPATIENT)
Age: 54
End: 2024-03-18

## 2024-03-18 VITALS
SYSTOLIC BLOOD PRESSURE: 155 MMHG | OXYGEN SATURATION: 100 % | DIASTOLIC BLOOD PRESSURE: 79 MMHG | HEART RATE: 71 BPM | RESPIRATION RATE: 18 BRPM

## 2024-03-18 VITALS
TEMPERATURE: 98 F | HEIGHT: 70 IN | RESPIRATION RATE: 17 BRPM | HEART RATE: 64 BPM | OXYGEN SATURATION: 98 % | SYSTOLIC BLOOD PRESSURE: 175 MMHG | WEIGHT: 255.96 LBS | DIASTOLIC BLOOD PRESSURE: 89 MMHG

## 2024-03-18 DIAGNOSIS — F41.0 PANIC DISORDER [EPISODIC PAROXYSMAL ANXIETY]: ICD-10-CM

## 2024-03-18 DIAGNOSIS — K08.409 PARTIAL LOSS OF TEETH, UNSPECIFIED CAUSE, UNSPECIFIED CLASS: Chronic | ICD-10-CM

## 2024-03-18 DIAGNOSIS — Z98.890 OTHER SPECIFIED POSTPROCEDURAL STATES: Chronic | ICD-10-CM

## 2024-03-18 PROCEDURE — D7210: CPT

## 2024-03-18 PROCEDURE — ZZZZZ: CPT

## 2024-03-18 DEVICE — SURGIFOAM PAD 8CM X 12.5CM X 2MM (100C): Type: IMPLANTABLE DEVICE | Status: FUNCTIONAL

## 2024-03-18 RX ORDER — EMPAGLIFLOZIN 10 MG/1
1 TABLET, FILM COATED ORAL
Refills: 0 | DISCHARGE

## 2024-03-18 RX ORDER — LISINOPRIL/HYDROCHLOROTHIAZIDE 10-12.5 MG
1 TABLET ORAL
Refills: 0 | DISCHARGE

## 2024-03-18 RX ORDER — ACETAMINOPHEN 500 MG
20.3 TABLET ORAL
Qty: 568.4 | Refills: 0
Start: 2024-03-18 | End: 2024-03-24

## 2024-03-18 RX ORDER — ONDANSETRON 8 MG/1
4 TABLET, FILM COATED ORAL ONCE
Refills: 0 | Status: DISCONTINUED | OUTPATIENT
Start: 2024-03-18 | End: 2024-04-02

## 2024-03-18 RX ORDER — NIFEDIPINE 30 MG
1 TABLET, EXTENDED RELEASE 24 HR ORAL
Refills: 0 | DISCHARGE

## 2024-03-18 RX ORDER — METFORMIN HYDROCHLORIDE 850 MG/1
1 TABLET ORAL
Refills: 0 | DISCHARGE

## 2024-03-18 RX ORDER — IBUPROFEN 200 MG
30 TABLET ORAL
Qty: 840 | Refills: 0
Start: 2024-03-18 | End: 2024-03-24

## 2024-03-18 RX ORDER — FENTANYL CITRATE 50 UG/ML
25 INJECTION INTRAVENOUS
Refills: 0 | Status: DISCONTINUED | OUTPATIENT
Start: 2024-03-18 | End: 2024-03-18

## 2024-03-18 RX ORDER — CHLORHEXIDINE GLUCONATE 213 G/1000ML
15 SOLUTION TOPICAL
Qty: 1 | Refills: 0
Start: 2024-03-18 | End: 2024-03-24

## 2024-03-18 NOTE — ASU DISCHARGE PLAN (ADULT/PEDIATRIC) - ASU DC SPECIAL INSTRUCTIONSFT
Follow-up w/ Dr. Miller in 1 week. Please call [phone] w/ any questions or concerns. Follow-up w/ Dr. Miller in 1 week. Please call [phone] w/ any questions or concerns.    You were given 1000mg IV Tylenol for pain management.  Please DO NOT take any Tylenol containing products, such as  Vicodin, Percocet, Excedrin, many cold preparations for the next 6 hours (until 1030p).  DO NOT EXCEED 3000MG OF TYLENOL OVER 24 HOURS.     You were given Toradol for pain management. Please DO Not take Motrin/Ibuprofen/Advil/Aleve (NSAIDS) for the next 6 hours (Until 1030pm)

## 2024-03-18 NOTE — ASU PREOP CHECKLIST - NS PREOP CHK HIBICLENS NA
Neurology Progress Note    S: Patient seen and examined.       Medications: MEDICATIONS  (STANDING):  amLODIPine   Tablet 10 milliGRAM(s) Oral daily  aspirin  chewable 81 milliGRAM(s) Oral daily  atorvastatin 80 milliGRAM(s) Oral at bedtime  chlorhexidine 0.12% Liquid 15 milliLiter(s) Oral Mucosa every 12 hours  chlorhexidine 2% Cloths 1 Application(s) Topical <User Schedule>  enoxaparin Injectable 40 milliGRAM(s) SubCutaneous every 24 hours  hydrALAZINE 100 milliGRAM(s) Oral every 8 hours  influenza  Vaccine (HIGH DOSE) 0.7 milliLiter(s) IntraMuscular once  polyethylene glycol 3350 17 Gram(s) Oral daily  scopolamine 1 mG/72 Hr(s) Patch 1 Patch Transdermal every 72 hours  senna 2 Tablet(s) Oral at bedtime    MEDICATIONS  (PRN):  acetaminophen     Tablet .. 650 milliGRAM(s) Oral every 6 hours PRN Temp greater or equal to 38C (100.4F)  bisacodyl Suppository 10 milliGRAM(s) Rectal daily PRN Constipation       Vitals:  Vital Signs Last 24 Hrs  T(C): 35.9 (21 Dec 2023 11:36), Max: 36.1 (21 Dec 2023 04:00)  T(F): 96.7 (21 Dec 2023 11:36), Max: 97 (21 Dec 2023 04:00)  HR: 63 (21 Dec 2023 15:20) (55 - 72)  BP: 119/52 (21 Dec 2023 15:20) (119/52 - 139/69)  BP(mean): 71 (21 Dec 2023 15:20) (71 - 91)  RR: 20 (21 Dec 2023 15:20) (15 - 26)  SpO2: 98% (21 Dec 2023 15:20) (96% - 100%)    Parameters below as of 20 Dec 2023 18:58  Patient On (Oxygen Delivery Method): ventilator    O2 Concentration (%): 25            Neurological Exam:  Mental Status: Intubated, not sedated. some spont eye opening. No verbal output, does not follow commands.   Cranial Nerves: pupils very sluggish R, L near fixed, no  corneals, no VOR, intermittent eyelid fluttering no facial asymmetry , weak cough/gag  Motor: dec tone throughout, no spont movemnt noted   Sensation: UE extensor posturing, LE triple flexion left toe up    I personally reviewed the below data/images/labs:      < from: CT Head No Cont (10.22.19 @ 15:57) >  IMPRESSION:    1)  chronic ischemic changes in both hemispheres with volume loss. There   are no new infarct as compared to prior CT. This may be further assessed   with MR imaging.  2)  no hemorrhage or mass identified.      LABS:                      < end of copied text >  < from: CT Angio Head w/ IV Cont (10.22.19 @ 15:57) >  IMPRESSION    1. There is intimal thickening and calcified plaque in both carotid   bifurcations in the neck, but without significant stenosis. No evidence   of carotid vertebral occlusion or dissection.  2. Intracranially, there are atherosclerotic changes in the anterior and   posterior circulation, but without significant stenosis or occlusion.    < end of copied text >  < from: CT Brain Stroke Protocol (10.26.23 @ 04:37) >  IMPRESSION:  No evidence of acute intracranial hemorrhage, mass effect or large acute   territorial infarct, within limits of noncontrast CT technique.    < end of copied text >  < from: CT Angio Neck Stroke Protocol w/ IV Cont (10.26.23 @ 04:53) >  CT ANGIOGRAPHY NECK:  1.  The right internal carotid artery is occluded approximately 1.5 cm   distal to its origin, possibly due to underlying dissection.  2.  Endotracheal tube is low in position, with its tip approximate 1 cm   above the slade.  3.  Diffuse ill-defined groundglass opacity in both lungs, which may   represent pulmonary edema, infection and/or atelectasis.  4.  There is a discrete right upper lobe groundglass opacity measuring   1.9 x 1.4 cm; this may represent a focus of infection, inflammation,   edema, hemorrhage, fibrosis or malignancy.  A follow-up chest CT is   warranted in three months to assess for resolution.    CT ANGIOGRAPHY BRAIN: The right internal carotid arteries occluded.    There is no significant flow related opacification within the proximal   right middle cerebral artery.  There is mild reconstitution of flow in   some distal branch vessels of the inferior division of the right middle   cerebral artery.  The right anterior cerebral artery fills across the   anterior communicating artery.    < end of copied text >  < from: CT Brain Perfusion Maps Stroke (10.26.23 @ 04:54) >  CT PERFUSION: CT perfusion is consistent with large acute infarct in the   right middle cerebral artery vascular territory and small to moderate   amount of surrounding ischemic penumbra.  Hypoperfusion index is 0.3.    Core infarct (CBF <  30%:): 136 mL  Penumbra (Tmax >6s): 194 mL  Mismatched volume: 58 mL  Mismatched ratio: 1.4    < end of copied text >      < from: CT Head No Cont (10.22.19 @ 15:57) >  IMPRESSION:    1)  chronic ischemic changes in both hemispheres with volume loss. There   are no new infarct as compared to prior CT. This may be further assessed   with MR imaging.  2)  no hemorrhage or mass identified.    < end of copied text >  < from: CT Angio Head w/ IV Cont (10.22.19 @ 15:57) >  IMPRESSION    1. There is intimal thickening and calcified plaque in both carotid   bifurcations in the neck, but without significant stenosis. No evidence   of carotid vertebral occlusion or dissection.  2. Intracranially, there are atherosclerotic changes in the anterior and   posterior circulation, but without significant stenosis or occlusion.    < end of copied text >  < from: CT Brain Stroke Protocol (10.26.23 @ 04:37) >  IMPRESSION:  No evidence of acute intracranial hemorrhage, mass effect or large acute   territorial infarct, within limits of noncontrast CT technique.    < end of copied text >  < from: CT Angio Neck Stroke Protocol w/ IV Cont (10.26.23 @ 04:53) >  CT ANGIOGRAPHY NECK:  1.  The right internal carotid artery is occluded approximately 1.5 cm   distal to its origin, possibly due to underlying dissection.  2.  Endotracheal tube is low in position, with its tip approximate 1 cm   above the slade.  3.  Diffuse ill-defined groundglass opacity in both lungs, which may   represent pulmonary edema, infection and/or atelectasis.  4.  There is a discrete right upper lobe groundglass opacity measuring   1.9 x 1.4 cm; this may represent a focus of infection, inflammation,   edema, hemorrhage, fibrosis or malignancy.  A follow-up chest CT is   warranted in three months to assess for resolution.    CT ANGIOGRAPHY BRAIN: The right internal carotid arteries occluded.    There is no significant flow related opacification within the proximal   right middle cerebral artery.  There is mild reconstitution of flow in   some distal branch vessels of the inferior division of the right middle   cerebral artery.  The right anterior cerebral artery fills across the   anterior communicating artery.    < end of copied text >  < from: CT Brain Perfusion Maps Stroke (10.26.23 @ 04:54) >  CT PERFUSION: CT perfusion is consistent with large acute infarct in the   right middle cerebral artery vascular territory and small to moderate   amount of surrounding ischemic penumbra.  Hypoperfusion index is 0.3.    Core infarct (CBF <  30%:): 136 mL  Penumbra (Tmax >6s): 194 mL  Mismatched volume: 58 mL  Mismatched ratio: 1.4    < end of copied text >      < from: CT Head No Cont (10.22.19 @ 15:57) >  IMPRESSION:    1)  chronic ischemic changes in both hemispheres with volume loss. There   are no new infarct as compared to prior CT. This may be further assessed   with MR imaging.  2)  no hemorrhage or mass identified.    < end of copied text >  < from: CT Angio Head w/ IV Cont (10.22.19 @ 15:57) >  IMPRESSION    1. There is intimal thickening and calcified plaque in both carotid   bifurcations in the neck, but without significant stenosis. No evidence   of carotid vertebral occlusion or dissection.  2. Intracranially, there are atherosclerotic changes in the anterior and   posterior circulation, but without significant stenosis or occlusion.    < end of copied text >  < from: CT Brain Stroke Protocol (10.26.23 @ 04:37) >  IMPRESSION:  No evidence of acute intracranial hemorrhage, mass effect or large acute   territorial infarct, within limits of noncontrast CT technique.    < end of copied text >  < from: CT Angio Neck Stroke Protocol w/ IV Cont (10.26.23 @ 04:53) >  CT ANGIOGRAPHY NECK:  1.  The right internal carotid artery is occluded approximately 1.5 cm   distal to its origin, possibly due to underlying dissection.  2.  Endotracheal tube is low in position, with its tip approximate 1 cm   above the slade.  3.  Diffuse ill-defined groundglass opacity in both lungs, which may   represent pulmonary edema, infection and/or atelectasis.  4.  There is a discrete right upper lobe groundglass opacity measuring   1.9 x 1.4 cm; this may represent a focus of infection, inflammation,   edema, hemorrhage, fibrosis or malignancy.  A follow-up chest CT is   warranted in three months to assess for resolution.    CT ANGIOGRAPHY BRAIN: The right internal carotid arteries occluded.    There is no significant flow related opacification within the proximal   right middle cerebral artery.  There is mild reconstitution of flow in   some distal branch vessels of the inferior division of the right middle   cerebral artery.  The right anterior cerebral artery fills across the   anterior communicating artery.    < end of copied text >  < from: CT Brain Perfusion Maps Stroke (10.26.23 @ 04:54) >  CT PERFUSION: CT perfusion is consistent with large acute infarct in the   right middle cerebral artery vascular territory and small to moderate   amount of surrounding ischemic penumbra.  Hypoperfusion index is 0.3.    Core infarct (CBF <  30%:): 136 mL  Penumbra (Tmax >6s): 194 mL  Mismatched volume: 58 mL  Mismatched ratio: 1.4    < end of copied text >    EEG Classification / Summary:  Abnormal EEG study  1. Intermittent periodic discharges seen intermittently over the left hemisphere, sometimes with triphasic morphology  2. Focal continuous right hemispheric slowing  3. Moderate generalized background slowing    -----------------------------------------------------------------------------------------------------    Clinical Impression:  1. While the periodic pattern noted above may represent lateralized periodic discharges indicating epileptic potential in the left hemisphere, more likely these represent generalized periodic discharges with triphasic morphology, consistent with metabolic etiology, that are poorly expressed on the right due to the effects of the stroke.   2. Structural abnormality in right hemisphere  3. Moderate diffuse/multi-focal cerebral dysfunction, not specific as to etiology.    < from: CT Brain Stroke Protocol (10.30.23 @ 11:02) >    IMPRESSION:  Large acute infarcts right JIMMY and MCA territories. Significant   associated mass effect and associated herniation.      < end of copied text >  < from: CT Head No Cont (11.02.23 @ 14:30) >    Reidentified are large subacute MCA and JIMMY infarcts throughout the right   frontal parietal and temporal lobes and right basalganglia, with severe   right to left midline shift and severe mass effect upon the ventricular   system, not significantly changed. There is effacement of all of the   basilar cisterns. Effacement of the sulci in the bilateral cerebri,   compatible with increased brain edema.    There is hypoattenuation within the right occipital lobe, markedly   worsened as compared to the prior exam, compatible with evolution of an   acute right PCA infarct.    Unsuccessful attempts were made to reach the ordering clinician via teams.      --- End of Report ---    < end of copied text >   N/A

## 2024-03-18 NOTE — ASU DISCHARGE PLAN (ADULT/PEDIATRIC) - FOLLOW UP APPOINTMENTS
may also call Recovery Room (PACU) 24/7 @ [phone]/[facility]

## 2024-03-18 NOTE — PROVIDER CONTACT NOTE (OTHER) - ASSESSMENT
Pt ambulatory, A&O x 4, VSS and returned to preop baseline. No distress noted. Discharged by Anesthesia and surgical team. Awaiting escort.

## 2024-03-18 NOTE — ASU PREOP CHECKLIST - HEIGHT IN FEET
Contacted pt about message left about wanting test results. Pt stated she was calling to get her husbands test results. Informed pt's wife that the results have not been released to me yet but she can see his results on his patient portal and they are also in the folder given to pt on the day when procedure is finished. Pt had verbal understanding.           ----- Message from Leilani Montana sent at 8/23/2023  2:43 PM CDT -----  Regarding: Call Back  Contact: [phone]  TEST RESULTS:   Patient would like to get test results.  Name of test (lab, mammo, etc.): Colonoscopy   Date of test: 8/18/23           
5

## 2024-03-18 NOTE — ASU DISCHARGE PLAN (ADULT/PEDIATRIC) - NS MD DC FALL RISK RISK
For information on Fall & Injury Prevention, visit: [email or web address] OR  [email or web address] OR  https://www.cdc.gov/steadi/patient.html

## 2024-03-18 NOTE — PROVIDER CONTACT NOTE (OTHER) - BACKGROUND
s/p a tooth extraction under anesthesia, came to phase two recovery. Pt was discharged by anesthesia and team. IV was removed. Pt was instructed to wait to be escorted via wheelchair. Pt walked out

## 2024-03-19 LAB — GLUCOSE BLDC GLUCOMTR-MCNC: 190 MG/DL — HIGH (ref 70–99)

## 2024-03-25 ENCOUNTER — APPOINTMENT (OUTPATIENT)
Age: 54
End: 2024-03-25

## 2024-04-22 PROBLEM — F41.0 PANIC DISORDER [EPISODIC PAROXYSMAL ANXIETY]: Chronic | Status: ACTIVE | Noted: 2024-03-14

## 2024-06-24 ENCOUNTER — APPOINTMENT (OUTPATIENT)
Age: 54
End: 2024-06-24
Payer: COMMERCIAL

## 2024-06-24 PROCEDURE — D0330 PANORAMIC RADIOGRAPHIC IMAGE: CPT

## 2024-06-24 PROCEDURE — D9310: CPT

## 2024-07-09 ENCOUNTER — OUTPATIENT (OUTPATIENT)
Dept: OUTPATIENT SERVICES | Facility: HOSPITAL | Age: 54
LOS: 1 days | End: 2024-07-09

## 2024-07-09 VITALS
SYSTOLIC BLOOD PRESSURE: 135 MMHG | TEMPERATURE: 98 F | WEIGHT: 250 LBS | RESPIRATION RATE: 20 BRPM | HEIGHT: 70 IN | OXYGEN SATURATION: 96 % | HEART RATE: 100 BPM | DIASTOLIC BLOOD PRESSURE: 90 MMHG

## 2024-07-09 DIAGNOSIS — E11.9 TYPE 2 DIABETES MELLITUS WITHOUT COMPLICATIONS: ICD-10-CM

## 2024-07-09 DIAGNOSIS — I10 ESSENTIAL (PRIMARY) HYPERTENSION: ICD-10-CM

## 2024-07-09 DIAGNOSIS — K02.9 DENTAL CARIES, UNSPECIFIED: ICD-10-CM

## 2024-07-09 DIAGNOSIS — F41.0 PANIC DISORDER [EPISODIC PAROXYSMAL ANXIETY]: ICD-10-CM

## 2024-07-09 DIAGNOSIS — Z98.890 OTHER SPECIFIED POSTPROCEDURAL STATES: Chronic | ICD-10-CM

## 2024-07-09 DIAGNOSIS — K08.409 PARTIAL LOSS OF TEETH, UNSPECIFIED CAUSE, UNSPECIFIED CLASS: Chronic | ICD-10-CM

## 2024-07-09 RX ORDER — DEXTROSE MONOHYDRATE AND SODIUM CHLORIDE 5; .3 G/100ML; G/100ML
1000 INJECTION, SOLUTION INTRAVENOUS
Refills: 0 | Status: DISCONTINUED | OUTPATIENT
Start: 2024-07-12 | End: 2024-07-13

## 2024-07-09 NOTE — H&P PST ADULT - RESPIRATORY
clear to auscultation bilaterally/no wheezes/no rales/no rhonchi clear to auscultation bilaterally/no wheezes

## 2024-07-09 NOTE — H&P PST ADULT - PROBLEM SELECTOR PLAN 1
Scheduled for Extraction of multiple   Preop instructions provided and patient verbalizes understanding.  Labs done  by pmd on 07/08/24and results pending  Famotidine provided with instructions. Patient verbalized understanding. Scheduled for Extraction of  #2,6,7,8,9,10,11,12,13,16,17,19,21,22,23,25,26,27,28, Alveoplasty on 07/12/24.  Preop instructions provided and patient verbalizes understanding.  Labs done  by pmd on 07/08/24and results pending  Famotidine provided with instructions. Patient verbalized understanding.

## 2024-07-09 NOTE — H&P PST ADULT - CARDIOVASCULAR
details… regular rate and rhythm/S1 S2 present/no gallops/no rub/no murmur regular rate and rhythm/S1 S2 present/no murmur

## 2024-07-09 NOTE — H&P PST ADULT - ATTENDING COMMENTS
indicated for removal of upper and lower dentition, and placement of dental implants upper and lower jaws, under GA

## 2024-07-09 NOTE — H&P PST ADULT - HISTORY OF PRESENT ILLNESS
53 yr old male with medical hx of HTN, HLD and T2DM presents for preop evaluation with c/o multiple loose teeth.  Patient was evaluated by dentist and as per pt " I have gum disease".  Patient is now scheduled for Extraction of Multiple #2,6,7,8,9,10,11,12,13,16,17,19,21,22,23,25,26,27,28, Alveoplasty on 07/12/24. 53 yr old male with medical hx of HTN, CAD (no stents), HLD and T2DM presents for preop evaluation with c/o multiple loose teeth.  Patient was evaluated by dentist and as per pt " I have gum disease".  Patient is now scheduled for Extraction of Multiple #2,6,7,8,9,10,11,12,13,16,17,19,21,22,23,25,26,27,28, Alveoplasty on 07/12/24.

## 2024-07-09 NOTE — H&P PST ADULT - LAST ECHOCARDIOGRAM
03/2023 normal LVSF, EF 64 % 03/2023 normal LVSF, EF 64 %, nl aortic valve, mild pulmonic regurgitation,

## 2024-07-09 NOTE — H&P PST ADULT - NEGATIVE ENMT SYMPTOMS
no hearing difficulty/no nasal congestion/no nasal discharge/no nose bleeds/no gum bleeding/no throat pain/no dysphagia

## 2024-07-11 ENCOUNTER — TRANSCRIPTION ENCOUNTER (OUTPATIENT)
Age: 54
End: 2024-07-11

## 2024-07-11 NOTE — ASU PATIENT PROFILE, ADULT - NS TRANSFER DENTURES
Intact Consent: The patient's consent was obtained including but not limited to risks of crusting, scabbing, blistering, scarring, darker or lighter pigmentary change, recurrence, incomplete removal and infection. Render Note In Bullet Format When Appropriate: No Post-Care Instructions: I reviewed with the patient in detail post-care instructions. Patient is to wear sunprotection, and avoid picking at any of the treated lesions. Pt may apply Vaseline to crusted or scabbing areas. Anesthesia Volume In Cc: 0.5 Total Number Of Lesions Treated: 7 Medical Necessity Clause: This procedure was medically necessary because the lesions that were treated were: Detail Level: Zone Medical Necessity Information: It is in your best interest to select a reason for this procedure from the list below. All of these items fulfill various CMS LCD requirements except the new and changing color options. none

## 2024-07-12 ENCOUNTER — APPOINTMENT (OUTPATIENT)
Age: 54
End: 2024-07-12

## 2024-07-12 ENCOUNTER — OUTPATIENT (OUTPATIENT)
Dept: OUTPATIENT SERVICES | Facility: HOSPITAL | Age: 54
LOS: 1 days | Discharge: HOME | End: 2024-07-12
Payer: COMMERCIAL

## 2024-07-12 VITALS
HEIGHT: 70 IN | DIASTOLIC BLOOD PRESSURE: 81 MMHG | HEART RATE: 95 BPM | TEMPERATURE: 98 F | RESPIRATION RATE: 16 BRPM | SYSTOLIC BLOOD PRESSURE: 154 MMHG | OXYGEN SATURATION: 97 % | WEIGHT: 250 LBS

## 2024-07-12 DIAGNOSIS — K02.9 DENTAL CARIES, UNSPECIFIED: ICD-10-CM

## 2024-07-12 DIAGNOSIS — Z98.890 OTHER SPECIFIED POSTPROCEDURAL STATES: Chronic | ICD-10-CM

## 2024-07-12 DIAGNOSIS — K08.409 PARTIAL LOSS OF TEETH, UNSPECIFIED CAUSE, UNSPECIFIED CLASS: Chronic | ICD-10-CM

## 2024-07-12 LAB
GLUCOSE BLDC GLUCOMTR-MCNC: 240 MG/DL — HIGH (ref 70–99)
GLUCOSE BLDC GLUCOMTR-MCNC: 287 MG/DL — HIGH (ref 70–99)

## 2024-07-12 PROCEDURE — D7230: CPT

## 2024-07-12 PROCEDURE — D6010: CPT

## 2024-07-12 PROCEDURE — D7210: CPT

## 2024-07-12 PROCEDURE — D7140: CPT

## 2024-07-12 DEVICE — SCREW COVER CONICAL CONN 4.3X13MM
Type: IMPLANTABLE DEVICE | Status: NON-FUNCTIONAL
Removed: 2024-07-12

## 2024-07-12 DEVICE — IMPLANTABLE DEVICE
Type: IMPLANTABLE DEVICE | Status: NON-FUNCTIONAL
Removed: 2024-07-12

## 2024-07-12 DEVICE — SURGIFOAM PAD 8CM X 12.5CM X 2MM (100C)
Type: IMPLANTABLE DEVICE | Status: NON-FUNCTIONAL
Removed: 2024-07-12

## 2024-07-12 DEVICE — IMP NOBELACTIVE PLATEFORM RP 4.3X10MM
Type: IMPLANTABLE DEVICE | Status: NON-FUNCTIONAL
Removed: 2024-07-12

## 2024-07-12 RX ORDER — DEXTROSE MONOHYDRATE AND SODIUM CHLORIDE 5; .3 G/100ML; G/100ML
1000 INJECTION, SOLUTION INTRAVENOUS
Refills: 0 | Status: DISCONTINUED | OUTPATIENT
Start: 2024-07-12 | End: 2024-07-13

## 2024-07-12 RX ORDER — HYDROMORPHONE HCL 0.2 MG/ML
1 INJECTION, SOLUTION INTRAVENOUS
Refills: 0 | Status: DISCONTINUED | OUTPATIENT
Start: 2024-07-12 | End: 2024-07-13

## 2024-07-12 RX ORDER — METOCLOPRAMIDE 5 MG/5ML
10 SOLUTION ORAL ONCE
Refills: 0 | Status: ACTIVE | OUTPATIENT
Start: 2024-07-12 | End: 2025-06-10

## 2024-07-12 RX ORDER — OXYCODONE HYDROCHLORIDE 100 MG/5ML
5 SOLUTION ORAL EVERY 4 HOURS
Refills: 0 | Status: DISCONTINUED | OUTPATIENT
Start: 2024-07-12 | End: 2024-07-13

## 2024-07-12 RX ORDER — ONDANSETRON HYDROCHLORIDE 2 MG/ML
4 INJECTION INTRAMUSCULAR; INTRAVENOUS ONCE
Refills: 0 | Status: DISCONTINUED | OUTPATIENT
Start: 2024-07-12 | End: 2024-07-13

## 2024-07-12 RX ORDER — DEXTROSE 30 % IN WATER 30 %
12.5 VIAL (ML) INTRAVENOUS ONCE
Refills: 0 | Status: DISCONTINUED | OUTPATIENT
Start: 2024-07-12 | End: 2024-07-13

## 2024-07-12 RX ORDER — DEXTROSE 30 % IN WATER 30 %
15 VIAL (ML) INTRAVENOUS ONCE
Refills: 0 | Status: DISCONTINUED | OUTPATIENT
Start: 2024-07-12 | End: 2024-07-13

## 2024-07-12 RX ORDER — DEXTROSE 30 % IN WATER 30 %
25 VIAL (ML) INTRAVENOUS ONCE
Refills: 0 | Status: DISCONTINUED | OUTPATIENT
Start: 2024-07-12 | End: 2024-07-13

## 2024-07-12 RX ORDER — ACETAMINOPHEN 325 MG
650 TABLET ORAL EVERY 6 HOURS
Refills: 0 | Status: ACTIVE | OUTPATIENT
Start: 2024-07-12 | End: 2025-06-10

## 2024-07-12 RX ORDER — GLUCAGON HYDROCHLORIDE 1 MG/ML
1 INJECTION, POWDER, FOR SOLUTION INTRAMUSCULAR; INTRAVENOUS; SUBCUTANEOUS ONCE
Refills: 0 | Status: DISCONTINUED | OUTPATIENT
Start: 2024-07-12 | End: 2024-07-13

## 2024-07-12 RX ORDER — INSULIN LISPRO 100 [IU]/ML
INJECTION, SOLUTION SUBCUTANEOUS
Refills: 0 | Status: DISCONTINUED | OUTPATIENT
Start: 2024-07-12 | End: 2024-07-13

## 2024-07-12 RX ORDER — HYDROMORPHONE HCL 0.2 MG/ML
0.5 INJECTION, SOLUTION INTRAVENOUS
Refills: 0 | Status: DISCONTINUED | OUTPATIENT
Start: 2024-07-12 | End: 2024-07-13

## 2024-07-12 RX ORDER — LISINOPRIL AND HYDROCHLOROTHIAZIDE 25; 20 MG/1; MG/1
1 TABLET ORAL
Refills: 0 | DISCHARGE

## 2024-07-12 RX ORDER — ONDANSETRON HYDROCHLORIDE 2 MG/ML
4 INJECTION INTRAMUSCULAR; INTRAVENOUS EVERY 6 HOURS
Refills: 0 | Status: ACTIVE | OUTPATIENT
Start: 2024-07-12 | End: 2025-06-10

## 2024-07-12 RX ORDER — DEXTROSE MONOHYDRATE AND SODIUM CHLORIDE 5; .3 G/100ML; G/100ML
1000 INJECTION, SOLUTION INTRAVENOUS
Refills: 0 | Status: ACTIVE | OUTPATIENT
Start: 2024-07-12 | End: 2025-06-10

## 2024-07-12 RX ADMIN — INSULIN LISPRO 3: 100 INJECTION, SOLUTION SUBCUTANEOUS at 23:29

## 2024-07-12 NOTE — BRIEF OPERATIVE NOTE - OPERATION/FINDINGS
Extraction of remaining maxillary and mandibular dentition, placement of 4x maxillary dental implants and 4x mandibular dental implants.

## 2024-07-12 NOTE — ASU PREOP CHECKLIST - TYPE OF SOLUTION
He was given an order today for repeat iron labs. He was told to continue his ferrous sulfate 325 mg daily until labs have been resulted.   
Hypertension is stable and controlled  Continue current treatment regimen.  Dietary sodium restriction.  Blood pressure will be reassessed in 6 months.  
LR

## 2024-07-12 NOTE — BRIEF OPERATIVE NOTE - NSICDXBRIEFPROCEDURE_GEN_ALL_CORE_FT
PROCEDURES:  Extraction tooth 12-Jul-2024 23:23:26  Godwin Willis  Extraction, tooth, with dental implant insertion 12-Jul-2024 23:23:41  Godwin Willis

## 2024-07-13 ENCOUNTER — TRANSCRIPTION ENCOUNTER (OUTPATIENT)
Age: 54
End: 2024-07-13

## 2024-07-13 DIAGNOSIS — I10 ESSENTIAL (PRIMARY) HYPERTENSION: ICD-10-CM

## 2024-07-13 DIAGNOSIS — E78.49 OTHER HYPERLIPIDEMIA: ICD-10-CM

## 2024-07-13 DIAGNOSIS — E11.65 TYPE 2 DIABETES MELLITUS WITH HYPERGLYCEMIA: ICD-10-CM

## 2024-07-13 LAB
A1C WITH ESTIMATED AVERAGE GLUCOSE RESULT: 11.3 % — HIGH (ref 4–5.6)
ALBUMIN SERPL ELPH-MCNC: 3.9 G/DL — SIGNIFICANT CHANGE UP (ref 3.3–5)
ALP SERPL-CCNC: 78 U/L — SIGNIFICANT CHANGE UP (ref 40–120)
ALT FLD-CCNC: 21 U/L — SIGNIFICANT CHANGE UP (ref 4–41)
ANION GAP SERPL CALC-SCNC: 16 MMOL/L — HIGH (ref 7–14)
AST SERPL-CCNC: 16 U/L — SIGNIFICANT CHANGE UP (ref 4–40)
BILIRUB SERPL-MCNC: 0.4 MG/DL — SIGNIFICANT CHANGE UP (ref 0.2–1.2)
BUN SERPL-MCNC: 17 MG/DL — SIGNIFICANT CHANGE UP (ref 7–23)
CALCIUM SERPL-MCNC: 9.3 MG/DL — SIGNIFICANT CHANGE UP (ref 8.4–10.5)
CHLORIDE SERPL-SCNC: 96 MMOL/L — LOW (ref 98–107)
CO2 SERPL-SCNC: 23 MMOL/L — SIGNIFICANT CHANGE UP (ref 22–31)
CREAT SERPL-MCNC: 1.14 MG/DL — SIGNIFICANT CHANGE UP (ref 0.5–1.3)
EGFR: 77 ML/MIN/1.73M2 — SIGNIFICANT CHANGE UP
ESTIMATED AVERAGE GLUCOSE: 278 — SIGNIFICANT CHANGE UP
GLUCOSE BLDC GLUCOMTR-MCNC: 183 MG/DL — HIGH (ref 70–99)
GLUCOSE BLDC GLUCOMTR-MCNC: 240 MG/DL — HIGH (ref 70–99)
GLUCOSE BLDC GLUCOMTR-MCNC: 286 MG/DL — HIGH (ref 70–99)
GLUCOSE BLDC GLUCOMTR-MCNC: 305 MG/DL — HIGH (ref 70–99)
GLUCOSE BLDC GLUCOMTR-MCNC: 318 MG/DL — HIGH (ref 70–99)
GLUCOSE SERPL-MCNC: 338 MG/DL — HIGH (ref 70–99)
HCT VFR BLD CALC: 44 % — SIGNIFICANT CHANGE UP (ref 39–50)
HGB BLD-MCNC: 14.4 G/DL — SIGNIFICANT CHANGE UP (ref 13–17)
MCHC RBC-ENTMCNC: 27.1 PG — SIGNIFICANT CHANGE UP (ref 27–34)
MCHC RBC-ENTMCNC: 32.7 GM/DL — SIGNIFICANT CHANGE UP (ref 32–36)
MCV RBC AUTO: 82.7 FL — SIGNIFICANT CHANGE UP (ref 80–100)
NRBC # BLD: 0 /100 WBCS — SIGNIFICANT CHANGE UP (ref 0–0)
NRBC # FLD: 0 K/UL — SIGNIFICANT CHANGE UP (ref 0–0)
PLATELET # BLD AUTO: 211 K/UL — SIGNIFICANT CHANGE UP (ref 150–400)
POTASSIUM SERPL-MCNC: 4.3 MMOL/L — SIGNIFICANT CHANGE UP (ref 3.5–5.3)
POTASSIUM SERPL-SCNC: 4.3 MMOL/L — SIGNIFICANT CHANGE UP (ref 3.5–5.3)
PROT SERPL-MCNC: 7.4 G/DL — SIGNIFICANT CHANGE UP (ref 6–8.3)
RBC # BLD: 5.32 M/UL — SIGNIFICANT CHANGE UP (ref 4.2–5.8)
RBC # FLD: 13.4 % — SIGNIFICANT CHANGE UP (ref 10.3–14.5)
SODIUM SERPL-SCNC: 135 MMOL/L — SIGNIFICANT CHANGE UP (ref 135–145)
WBC # BLD: 20.17 K/UL — HIGH (ref 3.8–10.5)
WBC # FLD AUTO: 20.17 K/UL — HIGH (ref 3.8–10.5)

## 2024-07-13 RX ORDER — INSULIN GLARGINE 100 [IU]/ML
20 INJECTION, SOLUTION SUBCUTANEOUS AT BEDTIME
Refills: 0 | Status: DISCONTINUED | OUTPATIENT
Start: 2024-07-13 | End: 2024-07-13

## 2024-07-13 RX ORDER — LABETALOL HYDROCHLORIDE 300 MG/1
10 TABLET ORAL ONCE
Refills: 0 | Status: COMPLETED | OUTPATIENT
Start: 2024-07-13 | End: 2024-07-13

## 2024-07-13 RX ORDER — INSULIN GLARGINE 100 [IU]/ML
30 INJECTION, SOLUTION SUBCUTANEOUS AT BEDTIME
Refills: 0 | Status: DISCONTINUED | OUTPATIENT
Start: 2024-07-13 | End: 2024-07-14

## 2024-07-13 RX ORDER — DEXTROSE 30 % IN WATER 30 %
25 VIAL (ML) INTRAVENOUS ONCE
Refills: 0 | Status: ACTIVE | OUTPATIENT
Start: 2024-07-13

## 2024-07-13 RX ORDER — DEXTROSE MONOHYDRATE AND SODIUM CHLORIDE 5; .3 G/100ML; G/100ML
1000 INJECTION, SOLUTION INTRAVENOUS
Refills: 0 | Status: ACTIVE | OUTPATIENT
Start: 2024-07-13 | End: 2025-06-11

## 2024-07-13 RX ORDER — INSULIN LISPRO 100 [IU]/ML
6 INJECTION, SOLUTION SUBCUTANEOUS
Refills: 0 | Status: ACTIVE | OUTPATIENT
Start: 2024-07-13 | End: 2025-06-11

## 2024-07-13 RX ORDER — INSULIN LISPRO 100 [IU]/ML
INJECTION, SOLUTION SUBCUTANEOUS AT BEDTIME
Refills: 0 | Status: DISCONTINUED | OUTPATIENT
Start: 2024-07-13 | End: 2024-07-13

## 2024-07-13 RX ORDER — DEXTROSE 30 % IN WATER 30 %
12.5 VIAL (ML) INTRAVENOUS ONCE
Refills: 0 | Status: ACTIVE | OUTPATIENT
Start: 2024-07-13

## 2024-07-13 RX ORDER — INSULIN LISPRO 100 [IU]/ML
INJECTION, SOLUTION SUBCUTANEOUS
Refills: 0 | Status: ACTIVE | OUTPATIENT
Start: 2024-07-13 | End: 2025-06-11

## 2024-07-13 RX ORDER — AMOXICILLIN/POTASSIUM CLAV 250-125 MG
1 TABLET ORAL
Refills: 0 | Status: ACTIVE | OUTPATIENT
Start: 2024-07-13 | End: 2025-06-11

## 2024-07-13 RX ORDER — GLUCAGON HYDROCHLORIDE 1 MG/ML
1 INJECTION, POWDER, FOR SOLUTION INTRAMUSCULAR; INTRAVENOUS; SUBCUTANEOUS ONCE
Refills: 0 | Status: ACTIVE | OUTPATIENT
Start: 2024-07-13 | End: 2025-06-11

## 2024-07-13 RX ORDER — INSULIN LISPRO 100 [IU]/ML
INJECTION, SOLUTION SUBCUTANEOUS AT BEDTIME
Refills: 0 | Status: ACTIVE | OUTPATIENT
Start: 2024-07-13 | End: 2025-06-11

## 2024-07-13 RX ORDER — DEXTROSE 30 % IN WATER 30 %
15 VIAL (ML) INTRAVENOUS ONCE
Refills: 0 | Status: ACTIVE | OUTPATIENT
Start: 2024-07-13 | End: 2025-06-11

## 2024-07-13 RX ADMIN — INSULIN LISPRO 2: 100 INJECTION, SOLUTION SUBCUTANEOUS at 17:03

## 2024-07-13 RX ADMIN — LABETALOL HYDROCHLORIDE 10 MILLIGRAM(S): 300 TABLET ORAL at 00:41

## 2024-07-13 RX ADMIN — INSULIN LISPRO 4: 100 INJECTION, SOLUTION SUBCUTANEOUS at 07:26

## 2024-07-13 RX ADMIN — Medication 600 MILLIGRAM(S): at 08:11

## 2024-07-13 RX ADMIN — INSULIN LISPRO 6 UNIT(S): 100 INJECTION, SOLUTION SUBCUTANEOUS at 17:02

## 2024-07-13 RX ADMIN — Medication 600 MILLIGRAM(S): at 13:28

## 2024-07-13 RX ADMIN — Medication 650 MILLIGRAM(S): at 13:28

## 2024-07-13 RX ADMIN — HYDROMORPHONE HCL 0.5 MILLIGRAM(S): 0.2 INJECTION, SOLUTION INTRAVENOUS at 01:00

## 2024-07-13 RX ADMIN — Medication 650 MILLIGRAM(S): at 23:30

## 2024-07-13 RX ADMIN — HYDROMORPHONE HCL 0.5 MILLIGRAM(S): 0.2 INJECTION, SOLUTION INTRAVENOUS at 01:15

## 2024-07-13 RX ADMIN — Medication 600 MILLIGRAM(S): at 17:52

## 2024-07-13 RX ADMIN — Medication 600 MILLIGRAM(S): at 07:27

## 2024-07-13 RX ADMIN — DEXTROSE MONOHYDRATE AND SODIUM CHLORIDE 100 MILLILITER(S): 5; .3 INJECTION, SOLUTION INTRAVENOUS at 01:50

## 2024-07-13 RX ADMIN — HYDROMORPHONE HCL 0.5 MILLIGRAM(S): 0.2 INJECTION, SOLUTION INTRAVENOUS at 00:13

## 2024-07-13 RX ADMIN — OXYCODONE HYDROCHLORIDE 5 MILLIGRAM(S): 100 SOLUTION ORAL at 06:20

## 2024-07-13 RX ADMIN — OXYCODONE HYDROCHLORIDE 5 MILLIGRAM(S): 100 SOLUTION ORAL at 07:20

## 2024-07-13 RX ADMIN — HYDROMORPHONE HCL 0.5 MILLIGRAM(S): 0.2 INJECTION, SOLUTION INTRAVENOUS at 00:56

## 2024-07-13 RX ADMIN — Medication 1 TABLET(S): at 17:52

## 2024-07-13 RX ADMIN — INSULIN GLARGINE 30 UNIT(S): 100 INJECTION, SOLUTION SUBCUTANEOUS at 21:32

## 2024-07-13 RX ADMIN — Medication 600 MILLIGRAM(S): at 02:01

## 2024-07-13 RX ADMIN — Medication 600 MILLIGRAM(S): at 01:49

## 2024-07-13 RX ADMIN — Medication 650 MILLIGRAM(S): at 12:31

## 2024-07-13 RX ADMIN — INSULIN LISPRO 4: 100 INJECTION, SOLUTION SUBCUTANEOUS at 11:40

## 2024-07-13 RX ADMIN — Medication 600 MILLIGRAM(S): at 17:56

## 2024-07-13 RX ADMIN — Medication 600 MILLIGRAM(S): at 12:31

## 2024-07-13 RX ADMIN — Medication 650 MILLIGRAM(S): at 03:41

## 2024-07-13 RX ADMIN — Medication 650 MILLIGRAM(S): at 04:41

## 2024-07-13 RX ADMIN — INSULIN LISPRO 6 UNIT(S): 100 INJECTION, SOLUTION SUBCUTANEOUS at 12:11

## 2024-07-13 NOTE — DISCHARGE NOTE PROVIDER - NSDCCPCAREPLAN_GEN_ALL_CORE_FT
PRINCIPAL DISCHARGE DIAGNOSIS  Diagnosis: Partial loss of teeth, unspecified edentulism class  Assessment and Plan of Treatment:

## 2024-07-13 NOTE — DISCHARGE NOTE PROVIDER - HOSPITAL COURSE
7/12/24 - Pt to OR for full mouth extraction and placement of dental implants. Progressing well, ABIGAIL.   7/13/24 - Monitoring pt for HbA1c<11 and glucose<300. Awaiting endocrine recommendations. 7/12/24 - Pt to OR for full mouth extraction and placement of dental implants. Progressing well, ABIGAIL.   7/13/24 - Monitoring pt for HbA1c<11 and glucose<300. Awaiting endocrine recommendations.   7/14/24 - Monitoring pt glucose<200 for possible d/c home 7/12/24 - Pt to OR for full mouth extraction and placement of dental implants. Progressing well, NAEON.   7/13/24 - Monitoring pt for HbA1c<11 and glucose<300. Awaiting endocrine recommendations.   7/14/24 - Blood glucose at 193 mg/dL at 1130 (written in vitals by nurse, unable to put under results). Endocrinology consult recommendations for medication adjustments to manage DM. Patient medically fit for discharge.

## 2024-07-13 NOTE — DISCHARGE NOTE PROVIDER - NSDCCPTREATMENT_GEN_ALL_CORE_FT
PRINCIPAL PROCEDURE  Procedure: Extraction of tooth with insertion of dental implant  Findings and Treatment:

## 2024-07-13 NOTE — DISCHARGE NOTE PROVIDER - CARE PROVIDER_API CALL
Nolan Miller.  Oral/Maxillofacial Surgery  [address], [address]  [place] [address]  Phone: [phone]  Fax: [phone]  Follow Up Time:

## 2024-07-13 NOTE — DISCHARGE NOTE PROVIDER - NSDCMRMEDTOKEN_GEN_ALL_CORE_FT
Jardiance 25 mg oral tablet: 1 tab(s) orally once a day last dose 07/08/24  lisinopril-hydrochlorothiazide 20 mg-25 mg oral tablet: 1 tab(s) orally once a day  NIFEdipine (Eqv-Procardia XL) 60 mg oral tablet, extended release: 1 tab(s) orally once a day   acetaminophen 650 mg oral tablet, extended release: 2 tab(s) orally 4 times a day  amoxicillin-clavulanate 875 mg-125 mg oral tablet: 875 milligram(s) orally 2 times a day  ibuprofen 600 mg oral tablet: 1 tab(s) orally every 8 hours as needed for  moderate pain  Jardiance 25 mg oral tablet: 1 tab(s) orally once a day last dose 07/08/24  lisinopril-hydrochlorothiazide 20 mg-25 mg oral tablet: 1 tab(s) orally once a day  NIFEdipine (Eqv-Procardia XL) 60 mg oral tablet, extended release: 1 tab(s) orally once a day  oxyCODONE 5 mg oral tablet: 1 tab(s) orally 4 times a day as needed for  severe pain MDD: 20  Peridex 0.12% mucous membrane liquid: 15 milliliter(s) orally 2 times a day   acetaminophen 650 mg oral tablet, extended release: 2 tab(s) orally 4 times a day  amoxicillin-clavulanate 875 mg-125 mg oral tablet: 875 milligram(s) orally 2 times a day  ibuprofen 600 mg oral tablet: 1 tab(s) orally every 8 hours as needed for  moderate pain  lisinopril-hydrochlorothiazide 20 mg-25 mg oral tablet: 1 tab(s) orally once a day  NIFEdipine (Eqv-Procardia XL) 60 mg oral tablet, extended release: 1 tab(s) orally once a day  oxyCODONE 5 mg oral tablet: 1 tab(s) orally 4 times a day as needed for  severe pain MDD: 20  Peridex 0.12% mucous membrane liquid: 15 milliliter(s) orally 2 times a day   acetaminophen 650 mg oral tablet, extended release: 2 tab(s) orally 4 times a day  amoxicillin-clavulanate 875 mg-125 mg oral tablet: 875 milligram(s) orally 2 times a day  BD edward pen needles: Please provide a 1 month supply of BD edward pen needles for the patient  ibuprofen 600 mg oral tablet: 1 tab(s) orally every 8 hours as needed for  moderate pain  Jardiance 25 mg oral tablet: 1 tab(s) orally once a day last dose 07/08/24  Lantus Solostar Pen 100 units/mL subcutaneous solution: 35 unit(s) subcutaneous once a day  lisinopril-hydrochlorothiazide 20 mg-25 mg oral tablet: 1 tab(s) orally once a day  metFORMIN 500 mg oral tablet: 1 tab(s) orally 2 times a day (with meals) Take 500 mg twice per day with meals for 1 week and then increase the dose to 1000 mg twice per day with meals ongoing  NIFEdipine (Eqv-Procardia XL) 60 mg oral tablet, extended release: 1 tab(s) orally once a day  oxyCODONE 5 mg oral tablet: 1 tab(s) orally 4 times a day as needed for  moderate pain MDD: 20  Peridex 0.12% mucous membrane liquid: 15 milliliter(s) orally 2 times a day

## 2024-07-13 NOTE — CONSULT NOTE ADULT - ASSESSMENT
53M Type 2 DM, CAD, HTN, HLD POD 1 s/p full mouth extractions with 8 dental implants noted with hyperglycemia to 300s.      #Poorly controlled T2DM with hyperglycemia  HbA1c 11.3%  Patient's home regimen is Lantus pen 20 units qAM although misses taking it 2-3 x per week due to forgetting when he gets home from work in AM.  Jardiance 25mg daily  Used to be on metformin but not sure what happened may have run out.  - Endocrinologist: none, sees a pcp Dr. Peck in [place]  PLAN  - Hold oral DM agents while inpatient  - Start Lantus 30 units at bedtime. DO NOT HOLD IF NPO.  - Continue Admelog 6 units TID pre-meal. HOLD IF NPO.  - Use low dose Admelog correction scale pre-meal  - Use low dose Admelog correction scale at bedtime  - Fingerstick BG before meals and bedtime  - Goal -180  - please change to Carbohydrate consistent diet  - RD consult    Discharge plan:  - Discharge medications: Increase Lantus dose finalize before dc, will try out 30 units tonight. Reinforced importance of taking this every day at home. Resume Jardiance 25mg daily, add back metformin 1000mg BID  - Patient to call doctor with persistent high or low BG at home.   - Ensure patient has glucometer, test strips and lancets on discharge.  -Patient to obtain endocrinology referral from his pcp in [place] and follow with Dr. Peck in the meanwhile.  -Discussed CGM patient tried it before and declines.    #HTN  - Home regimen: lisinopril-HCT and nifedipine  PLAN  - Can check urine albumin/creatinine outpatient  - Outpatient goal BP <130/80. Management per primary team.  -Please resume home BP meds being held in hospital    #HLD  - outpatient lipid profile, may benefit from statin    Discussed with primary team.    Endocrine team consulted for uncontrolled diabetes. Patient is high risk with high level decision making due to uncontrolled diabetes which places patient at high risk for cardiovascular and cerebrovascular events. Patient with lability of glucose requiring close monitoring and insulin adjustments.    Anders Ayala MD  Division of Endocrinology  Pager: [phone]    If after 6PM or before 9AM, or on weekends/holidays, please call endocrine answering service for assistance ([phone]).  For nonurgent matters email [email or web address] for assistance. 53M Type 2 DM, CAD, HTN, HLD POD 1 s/p full mouth extractions with 8 dental implants noted with hyperglycemia to 300s.    #Poorly controlled T2DM with hyperglycemia  HbA1c 11.3%  Patient's home regimen is Lantus pen 20 units qAM although misses taking it 2-3 x per week due to forgetting when he gets home from work in AM.  Jardiance 25mg daily  Used to be on metformin but not sure what happened may have run out.  - Endocrinologist: none, sees a pcp Dr. Peck in [place]  PLAN  - Hold oral DM agents while inpatient  - Start Lantus 30 units at bedtime. DO NOT HOLD IF NPO.  - Continue Admelog 6 units TID pre-meal. HOLD IF NPO.  - Use low dose Admelog correction scale pre-meal  - Use low dose Admelog correction scale at bedtime  - Fingerstick BG before meals and bedtime  - Goal -180  - please change to Carbohydrate consistent diet  - RD consult    Discharge plan:  - Discharge medications: Increase Lantus dose finalize before dc, will try out 30 units tonight. Reinforced importance of taking this every day at home. Resume Jardiance 25mg daily, add back metformin 1000mg BID  - Patient to call doctor with persistent high or low BG at home.   - Ensure patient has glucometer, test strips and lancets on discharge.  -Patient to obtain endocrinology referral from his pcp in [place] and follow with Dr. Peck in the meanwhile.  -Discussed CGM patient tried it before and declines.    #HTN  - Home regimen: lisinopril-HCT and nifedipine  PLAN  - Can check urine albumin/creatinine outpatient  - Outpatient goal BP <130/80. Management per primary team.  -Please resume home BP meds being held in hospital    #HLD  - outpatient lipid profile, may benefit from statin    Discussed with primary team.    Endocrine team consulted for uncontrolled diabetes. Patient is high risk with high level decision making due to uncontrolled diabetes which places patient at high risk for cardiovascular and cerebrovascular events. Patient with lability of glucose requiring close monitoring and insulin adjustments.    Anders Ayala MD  Division of Endocrinology  Pager: [phone]    If after 6PM or before 9AM, or on weekends/holidays, please call endocrine answering service for assistance ([phone]).  For nonurgent matters email [email or web address] for assistance. 5

## 2024-07-13 NOTE — DISCHARGE NOTE PROVIDER - NSDCFUSCHEDAPPT_GEN_ALL_CORE_FT
[facility] Physician [facility]  DENTAL [phone]  Scheduled Appointment: 07/19/2024

## 2024-07-13 NOTE — CONSULT NOTE ADULT - SUBJECTIVE AND OBJECTIVE BOX
HPI:53M Type 2 DM, CAD, HTN, HLD POD 1 s/p full mouth extractions with 8 dental implants noted with hyperglycemia to 300s.  HbA1c 11.3%  Patient's home regimen is Lantus pen 20 units qAM although misses taking it 2-3 x per week due to forgetting when he gets home from work in AM.  Jardiance 25mg daily  Used to be on metformin but not sure what happened may have run out.  Not routinely checking glucose at home, when he does it is around 200.  Diet "could be better"  Denies blurry vision, numbness in feet  Sees a Dr. Peck pcp in [place], no endocrinologist.  HTN on lisinopril-HCT and nifedipine.      PAST MEDICAL & SURGICAL HISTORY:  HTN (hypertension)      HLD (hyperlipidemia)      Type 2 diabetes mellitus      CAD (coronary artery disease)      Panic disorder without agoraphobia      S/P foot surgery      H/O tooth extraction          FAMILY HISTORY:  Family history of diabetes mellitus (DM) (Mother)        Social History: night shift worker    Jardiance 25 mg oral tablet: 1 tab(s) orally once a day last dose 07/08/24  lisinopril-hydrochlorothiazide 20 mg-25 mg oral tablet: 1 tab(s) orally once a day  NIFEdipine (Eqv-Procardia XL) 60 mg oral tablet, extended release: 1 tab(s) orally once a day      MEDICATIONS  (STANDING):  acetaminophen   Oral Liquid .. 650 milliGRAM(s) Oral every 6 hours  amoxicillin  875 milliGRAM(s)/clavulanate 1 Tablet(s) Oral two times a day  dextrose 5%. 1000 milliLiter(s) (100 mL/Hr) IV Continuous <Continuous>  dextrose 5%. 1000 milliLiter(s) (50 mL/Hr) IV Continuous <Continuous>  dextrose 50% Injectable 12.5 Gram(s) IV Push once  dextrose 50% Injectable 25 Gram(s) IV Push once  dextrose 50% Injectable 25 Gram(s) IV Push once  glucagon  Injectable 1 milliGRAM(s) IntraMuscular once  ibuprofen  Suspension. 600 milliGRAM(s) Oral every 6 hours  insulin glargine Injectable (LANTUS) 20 Unit(s) SubCutaneous at bedtime  insulin lispro (ADMELOG) corrective regimen sliding scale   SubCutaneous three times a day before meals  insulin lispro Injectable (ADMELOG) 6 Unit(s) SubCutaneous three times a day before meals  lactated ringers. 1000 milliLiter(s) (100 mL/Hr) IV Continuous <Continuous>    MEDICATIONS  (PRN):  dextrose Oral Gel 15 Gram(s) Oral once PRN Blood Glucose LESS THAN 70 milliGRAM(s)/deciliter  metoclopramide Injectable 10 milliGRAM(s) IV Push once PRN Nausea and/or Vomiting (second line)  ondansetron Injectable 4 milliGRAM(s) IV Push every 6 hours PRN Nausea and/or Vomiting  oxyCODONE    Solution 5 milliGRAM(s) Oral every 4 hours PRN Moderate Pain (4 - 6)      Allergies    No Known Allergies    Intolerances    Seafood (Stomach Upset)  fish (Stomach Upset)    Review of Systems:  Constitutional: No fever  Eyes: No blurry vision  Neuro: No tremors  HEENT: No pain  Cardiovascular: No chest pain, palpitations  Respiratory: No SOB, no cough  GI: No nausea, vomiting, abdominal pain  : No dysuria  Skin: no rash  Psych: no depression  Endocrine: no polyuria, polydipsia  Hem/lymph: no swelling  Osteoporosis: no fractures    ALL OTHER SYSTEMS REVIEWED AND NEGATIVE      PHYSICAL EXAM:  VITALS: T(C): 36.9 (07-13-24 @ 17:17)  T(F): 98.5 (07-13-24 @ 17:17), Max: 98.8 (07-13-24 @ 13:15)  HR: 85 (07-13-24 @ 17:17) (78 - 98)  BP: 145/72 (07-13-24 @ 17:17) (141/75 - 175/96)  RR:  (12 - 23)  SpO2:  (94% - 99%)  Wt(kg): --  GENERAL: NAD, well-groomed, well-developed  EYES: No proptosis, no lid lag, anicteric  HEENT:  Atraumatic, Normocephalic, moist mucous membranes  THYROID: Normal size, no palpable nodules  RESPIRATORY: Clear to auscultation bilaterally; No rales, rhonchi, wheezing  CARDIOVASCULAR: Regular rate and rhythm; No murmurs; no peripheral edema  GI: Soft, nontender, non distended, normal bowel sounds  SKIN: Dry, intact, No rashes or lesions  MUSCULOSKELETAL: Full range of motion, normal strength  NEURO: sensation intact, extraocular movements intact, no tremor  PSYCH: Alert and oriented x 3, normal affect, normal mood  CUSHING'S SIGNS: no striae      CAPILLARY BLOOD GLUCOSE      POCT Blood Glucose.: 240 mg/dL (13 Jul 2024 16:41)  POCT Blood Glucose.: 318 mg/dL (13 Jul 2024 11:34)  POCT Blood Glucose.: 305 mg/dL (13 Jul 2024 07:21)  POCT Blood Glucose.: 286 mg/dL (13 Jul 2024 01:34)  POCT Blood Glucose.: 287 mg/dL (12 Jul 2024 23:21)                            14.4   20.17 )-----------( 211      ( 13 Jul 2024 13:02 )             44.0       07-13    135  |  96<L>  |  17  ----------------------------<  338<H>  4.3   |  23  |  1.14    eGFR: 77    Ca    9.3      07-13    TPro  7.4  /  Alb  3.9  /  TBili  0.4  /  DBili  x   /  AST  16  /  ALT  21  /  AlkPhos  78  07-13      Thyroid Function Tests:      A1C with Estimated Average Glucose Result: 11.3 % (07-13-24 @ 06:43)          Radiology:

## 2024-07-14 VITALS
RESPIRATION RATE: 17 BRPM | HEART RATE: 88 BPM | SYSTOLIC BLOOD PRESSURE: 147 MMHG | OXYGEN SATURATION: 97 % | DIASTOLIC BLOOD PRESSURE: 81 MMHG | TEMPERATURE: 98 F

## 2024-07-14 LAB
ANION GAP SERPL CALC-SCNC: 13 MMOL/L — SIGNIFICANT CHANGE UP (ref 7–14)
BUN SERPL-MCNC: 16 MG/DL — SIGNIFICANT CHANGE UP (ref 7–23)
CALCIUM SERPL-MCNC: 9 MG/DL — SIGNIFICANT CHANGE UP (ref 8.4–10.5)
CHLORIDE SERPL-SCNC: 99 MMOL/L — SIGNIFICANT CHANGE UP (ref 98–107)
CO2 SERPL-SCNC: 25 MMOL/L — SIGNIFICANT CHANGE UP (ref 22–31)
CREAT SERPL-MCNC: 1.06 MG/DL — SIGNIFICANT CHANGE UP (ref 0.5–1.3)
EGFR: 84 ML/MIN/1.73M2 — SIGNIFICANT CHANGE UP
GLUCOSE BLDC GLUCOMTR-MCNC: 143 MG/DL — HIGH (ref 70–99)
GLUCOSE BLDC GLUCOMTR-MCNC: 248 MG/DL — HIGH (ref 70–99)
GLUCOSE SERPL-MCNC: 255 MG/DL — HIGH (ref 70–99)
HCT VFR BLD CALC: 41.9 % — SIGNIFICANT CHANGE UP (ref 39–50)
HGB BLD-MCNC: 13.7 G/DL — SIGNIFICANT CHANGE UP (ref 13–17)
MAGNESIUM SERPL-MCNC: 2.1 MG/DL — SIGNIFICANT CHANGE UP (ref 1.6–2.6)
MCHC RBC-ENTMCNC: 26.7 PG — LOW (ref 27–34)
MCHC RBC-ENTMCNC: 32.7 GM/DL — SIGNIFICANT CHANGE UP (ref 32–36)
MCV RBC AUTO: 81.7 FL — SIGNIFICANT CHANGE UP (ref 80–100)
NRBC # BLD: 0 /100 WBCS — SIGNIFICANT CHANGE UP (ref 0–0)
NRBC # FLD: 0 K/UL — SIGNIFICANT CHANGE UP (ref 0–0)
PHOSPHATE SERPL-MCNC: 1.9 MG/DL — LOW (ref 2.5–4.5)
PLATELET # BLD AUTO: 189 K/UL — SIGNIFICANT CHANGE UP (ref 150–400)
POTASSIUM SERPL-MCNC: 3.5 MMOL/L — SIGNIFICANT CHANGE UP (ref 3.5–5.3)
POTASSIUM SERPL-SCNC: 3.5 MMOL/L — SIGNIFICANT CHANGE UP (ref 3.5–5.3)
RBC # BLD: 5.13 M/UL — SIGNIFICANT CHANGE UP (ref 4.2–5.8)
RBC # FLD: 13.5 % — SIGNIFICANT CHANGE UP (ref 10.3–14.5)
SODIUM SERPL-SCNC: 137 MMOL/L — SIGNIFICANT CHANGE UP (ref 135–145)
WBC # BLD: 15.56 K/UL — HIGH (ref 3.8–10.5)
WBC # FLD AUTO: 15.56 K/UL — HIGH (ref 3.8–10.5)

## 2024-07-14 RX ORDER — AMOXICILLIN/POTASSIUM CLAV 250-125 MG
875 TABLET ORAL
Qty: 14 | Refills: 0
Start: 2024-07-14 | End: 2024-07-20

## 2024-07-14 RX ORDER — OXYCODONE HYDROCHLORIDE 100 MG/5ML
1 SOLUTION ORAL
Qty: 8 | Refills: 0
Start: 2024-07-14 | End: 2024-07-15

## 2024-07-14 RX ORDER — EMPAGLIFLOZIN 25 MG/1
1 TABLET, FILM COATED ORAL
Qty: 30 | Refills: 0
Start: 2024-07-14 | End: 2024-08-12

## 2024-07-14 RX ORDER — OXYCODONE HYDROCHLORIDE 100 MG/5ML
1 SOLUTION ORAL
Qty: 8 | Refills: 0
Start: 2024-07-14

## 2024-07-14 RX ORDER — METFORMIN HYDROCHLORIDE 850 MG/1
1 TABLET, FILM COATED ORAL
Qty: 98 | Refills: 0
Start: 2024-07-14 | End: 2024-08-12

## 2024-07-14 RX ORDER — ACETAMINOPHEN 325 MG
2 TABLET ORAL
Qty: 56 | Refills: 0
Start: 2024-07-14 | End: 2024-07-20

## 2024-07-14 RX ORDER — INSULIN GLARGINE 100 [IU]/ML
35 INJECTION, SOLUTION SUBCUTANEOUS
Qty: 11 | Refills: 0
Start: 2024-07-14 | End: 2024-08-12

## 2024-07-14 RX ORDER — INSULIN GLARGINE 100 [IU]/ML
35 INJECTION, SOLUTION SUBCUTANEOUS AT BEDTIME
Refills: 0 | Status: ACTIVE | OUTPATIENT
Start: 2024-07-14 | End: 2025-06-12

## 2024-07-14 RX ORDER — EMPAGLIFLOZIN 25 MG/1
1 TABLET, FILM COATED ORAL
Refills: 0 | DISCHARGE

## 2024-07-14 RX ADMIN — Medication 1 TABLET(S): at 05:11

## 2024-07-14 RX ADMIN — INSULIN LISPRO 6 UNIT(S): 100 INJECTION, SOLUTION SUBCUTANEOUS at 11:35

## 2024-07-14 RX ADMIN — INSULIN LISPRO 6 UNIT(S): 100 INJECTION, SOLUTION SUBCUTANEOUS at 16:59

## 2024-07-14 RX ADMIN — INSULIN LISPRO 2: 100 INJECTION, SOLUTION SUBCUTANEOUS at 07:42

## 2024-07-14 RX ADMIN — Medication 600 MILLIGRAM(S): at 12:22

## 2024-07-14 RX ADMIN — Medication 600 MILLIGRAM(S): at 05:11

## 2024-07-14 RX ADMIN — Medication 650 MILLIGRAM(S): at 00:20

## 2024-07-14 RX ADMIN — Medication 600 MILLIGRAM(S): at 06:11

## 2024-07-14 RX ADMIN — Medication 600 MILLIGRAM(S): at 17:42

## 2024-07-14 RX ADMIN — INSULIN LISPRO 1: 100 INJECTION, SOLUTION SUBCUTANEOUS at 11:37

## 2024-07-14 RX ADMIN — INSULIN LISPRO 6 UNIT(S): 100 INJECTION, SOLUTION SUBCUTANEOUS at 07:42

## 2024-07-14 RX ADMIN — Medication 600 MILLIGRAM(S): at 12:06

## 2024-07-14 RX ADMIN — Medication 1 TABLET(S): at 17:44

## 2024-07-14 NOTE — DISCHARGE NOTE NURSING/CASE MANAGEMENT/SOCIAL WORK - NSDCPEFALRISK_GEN_ALL_CORE
For information on Fall & Injury Prevention, visit: [email or web address] OR  [email or web address] OR  https://www.cdc.gov/steadi/patient.html

## 2024-07-14 NOTE — PROGRESS NOTE ADULT - SUBJECTIVE AND OBJECTIVE BOX
53M with PMH of CAD, HLD, HTN, and uncontrolled DM is POD2 s/p full mouth extraction and placement of 8 dental implants. Pt has an HbA1C>11 and had an endocrine consult completed yesterday who made recommendations to better control his DM. Blood glucose taken at 0732 resulted as 248 mg/dL    Interval: JENN HAYES.     Focused physical exam  General: Pt appears well  Neuro: AAOx3, CN II-XII grossly intact  Head: Normocephalic    Extra oral exam: ARIEL > 35 mm, no trismus, no LAD, inferior border of the mandible fully palpable.  Intra oral exam: Surgical sites healing by primary intention with sutures in place. No dehiscences, purulence, or exudate noted. Mild vestibular ecchymosis and edema as expected in the immediate post operative period.     Vitals:     Vital Signs Last 24 Hrs  T(C): 36.3 (14 Jul 2024 05:48), Max: 37.1 (13 Jul 2024 13:15)  T(F): 97.4 (14 Jul 2024 05:48), Max: 98.8 (13 Jul 2024 13:15)  HR: 81 (14 Jul 2024 05:48) (74 - 85)  BP: 126/62 (14 Jul 2024 05:48) (126/62 - 154/84)  BP(mean): --  RR: 17 (14 Jul 2024 05:48) (17 - 18)  SpO2: 96% (14 Jul 2024 05:48) (95% - 99%)    Parameters below as of 14 Jul 2024 05:48  Patient On (Oxygen Delivery Method): room air      I&O's Detail    13 Jul 2024 07:01  -  14 Jul 2024 07:00  --------------------------------------------------------  IN:  Total IN: 0 mL    OUT:    Voided (mL): 1600 mL  Total OUT: 1600 mL    Total NET: -1600 mL          Medications:    MEDICATIONS  (STANDING):  acetaminophen   Oral Liquid .. 650 milliGRAM(s) Oral every 6 hours  amoxicillin  875 milliGRAM(s)/clavulanate 1 Tablet(s) Oral two times a day  dextrose 5%. 1000 milliLiter(s) (100 mL/Hr) IV Continuous <Continuous>  dextrose 5%. 1000 milliLiter(s) (50 mL/Hr) IV Continuous <Continuous>  dextrose 50% Injectable 12.5 Gram(s) IV Push once  dextrose 50% Injectable 25 Gram(s) IV Push once  dextrose 50% Injectable 25 Gram(s) IV Push once  glucagon  Injectable 1 milliGRAM(s) IntraMuscular once  ibuprofen  Suspension. 600 milliGRAM(s) Oral every 6 hours  insulin glargine Injectable (LANTUS) 30 Unit(s) SubCutaneous at bedtime  insulin lispro (ADMELOG) corrective regimen sliding scale   SubCutaneous three times a day before meals  insulin lispro (ADMELOG) corrective regimen sliding scale   SubCutaneous at bedtime  insulin lispro Injectable (ADMELOG) 6 Unit(s) SubCutaneous three times a day before meals  lactated ringers. 1000 milliLiter(s) (100 mL/Hr) IV Continuous <Continuous>    MEDICATIONS  (PRN):  dextrose Oral Gel 15 Gram(s) Oral once PRN Blood Glucose LESS THAN 70 milliGRAM(s)/deciliter  metoclopramide Injectable 10 milliGRAM(s) IV Push once PRN Nausea and/or Vomiting (second line)  ondansetron Injectable 4 milliGRAM(s) IV Push every 6 hours PRN Nausea and/or Vomiting  oxyCODONE    Solution 5 milliGRAM(s) Oral every 4 hours PRN Moderate Pain (4 - 6)      Labs:                          13.7   15.56 )-----------( 189      ( 14 Jul 2024 06:05 )             41.9       07-14    137  |  99  |  16  ----------------------------<  255<H>  3.5   |  25  |  1.06    Ca    9.0      14 Jul 2024 07:09  Phos  1.9     07-14  Mg     2.10     07-14    TPro  7.4  /  Alb  3.9  /  TBili  0.4  /  DBili  x   /  AST  16  /  ALT  21  /  AlkPhos  78  07-13          
53M with PMH of CAD, HLD, HTN, and uncontrolled DM is POD1 s/p full mouth extraction and placement of 8 dental implants. Pt has an HbA1C>11 and glucose>11 and requires consult with endocrine before discharge.      Interval: JENN HAYES.     Focused physical exam  General: Pt appears well  Neuro: AAOx3, CN II-XII grossly intact  Head: Normocephalic    Extra oral exam: ARIEL 30, no trismus, no LAD, inferior border of the mandible fully palpable.  Intra oral exam: no vestibular pathology, within normal limits of healing    Vitals:     Vital Signs Last 24 Hrs  T(C): 36.7 (13 Jul 2024 09:16), Max: 36.9 (12 Jul 2024 15:10)  T(F): 98.1 (13 Jul 2024 09:16), Max: 98.4 (12 Jul 2024 15:10)  HR: 80 (13 Jul 2024 09:16) (78 - 98)  BP: 146/78 (13 Jul 2024 09:16) (141/81 - 175/96)  BP(mean): 100 (13 Jul 2024 01:30) (93 - 113)  RR: 17 (13 Jul 2024 09:16) (12 - 23)  SpO2: 98% (13 Jul 2024 09:16) (94% - 99%)    Parameters below as of 13 Jul 2024 09:16  Patient On (Oxygen Delivery Method): room air        I&O's Detail    12 Jul 2024 07:01  -  13 Jul 2024 07:00  --------------------------------------------------------  IN:    Lactated Ringers: 200 mL    Oral Fluid: 720 mL  Total IN: 920 mL    OUT:    Voided (mL): 1400 mL  Total OUT: 1400 mL    Total NET: -480 mL      13 Jul 2024 07:01  -  13 Jul 2024 10:00  --------------------------------------------------------  IN:  Total IN: 0 mL    OUT:    Voided (mL): 500 mL  Total OUT: 500 mL    Total NET: -500 mL          Medications:    MEDICATIONS  (STANDING):  acetaminophen   Oral Liquid .. 650 milliGRAM(s) Oral every 6 hours  dextrose 5%. 1000 milliLiter(s) (100 mL/Hr) IV Continuous <Continuous>  dextrose 5%. 1000 milliLiter(s) (50 mL/Hr) IV Continuous <Continuous>  dextrose 50% Injectable 12.5 Gram(s) IV Push once  dextrose 50% Injectable 25 Gram(s) IV Push once  dextrose 50% Injectable 25 Gram(s) IV Push once  glucagon  Injectable 1 milliGRAM(s) IntraMuscular once  ibuprofen  Suspension. 600 milliGRAM(s) Oral every 6 hours  insulin lispro (ADMELOG) corrective regimen sliding scale   SubCutaneous three times a day before meals  insulin lispro (ADMELOG) corrective regimen sliding scale   SubCutaneous at bedtime  lactated ringers. 1000 milliLiter(s) (100 mL/Hr) IV Continuous <Continuous>  lactated ringers. 1000 milliLiter(s) (30 mL/Hr) IV Continuous <Continuous>    MEDICATIONS  (PRN):  dextrose Oral Gel 15 Gram(s) Oral once PRN Blood Glucose LESS THAN 70 milliGRAM(s)/deciliter  metoclopramide Injectable 10 milliGRAM(s) IV Push once PRN Nausea and/or Vomiting (second line)  ondansetron Injectable 4 milliGRAM(s) IV Push every 6 hours PRN Nausea and/or Vomiting  oxyCODONE    Solution 5 milliGRAM(s) Oral every 4 hours PRN Moderate Pain (4 - 6)      Labs:            
    Chief Complaint: DM2    History: tolerating full liquid diet  no hypoglycemia events or symptoms    MEDICATIONS  (STANDING):  acetaminophen   Oral Liquid .. 650 milliGRAM(s) Oral every 6 hours  amoxicillin  875 milliGRAM(s)/clavulanate 1 Tablet(s) Oral two times a day  dextrose 5%. 1000 milliLiter(s) (50 mL/Hr) IV Continuous <Continuous>  dextrose 5%. 1000 milliLiter(s) (100 mL/Hr) IV Continuous <Continuous>  dextrose 50% Injectable 25 Gram(s) IV Push once  dextrose 50% Injectable 25 Gram(s) IV Push once  dextrose 50% Injectable 12.5 Gram(s) IV Push once  glucagon  Injectable 1 milliGRAM(s) IntraMuscular once  ibuprofen  Suspension. 600 milliGRAM(s) Oral every 6 hours  insulin glargine Injectable (LANTUS) 30 Unit(s) SubCutaneous at bedtime  insulin lispro (ADMELOG) corrective regimen sliding scale   SubCutaneous three times a day before meals  insulin lispro (ADMELOG) corrective regimen sliding scale   SubCutaneous at bedtime  insulin lispro Injectable (ADMELOG) 6 Unit(s) SubCutaneous three times a day before meals  lactated ringers. 1000 milliLiter(s) (100 mL/Hr) IV Continuous <Continuous>    MEDICATIONS  (PRN):  dextrose Oral Gel 15 Gram(s) Oral once PRN Blood Glucose LESS THAN 70 milliGRAM(s)/deciliter  metoclopramide Injectable 10 milliGRAM(s) IV Push once PRN Nausea and/or Vomiting (second line)  ondansetron Injectable 4 milliGRAM(s) IV Push every 6 hours PRN Nausea and/or Vomiting  oxyCODONE    Solution 5 milliGRAM(s) Oral every 4 hours PRN Moderate Pain (4 - 6)      Allergies    No Known Allergies    Intolerances    Seafood (Stomach Upset)  fish (Stomach Upset)    Review of Systems:    ALL OTHER SYSTEMS REVIEWED AND NEGATIVE        PHYSICAL EXAM:  VITALS: T(C): 36.7 (07-14-24 @ 09:26)  T(F): 98.1 (07-14-24 @ 09:26), Max: 98.5 (07-13-24 @ 17:17)  HR: 82 (07-14-24 @ 09:26) (74 - 85)  BP: 141/85 (07-14-24 @ 09:26) (126/62 - 154/84)  RR:  (17 - 18)  SpO2:  (95% - 99%)  Wt(kg): --  GENERAL: NAD, well-groomed, well-developed  EYES: No proptosis, no lid lag, anicteric  HEENT:  Atraumatic, Normocephalic, moist mucous membranes  RESPIRATORY: nonlabored respirations, no wheezing  PSYCH: Alert and oriented x 3, normal affect, normal mood    CAPILLARY BLOOD GLUCOSE      POCT Blood Glucose.: 248 mg/dL (14 Jul 2024 07:32)  POCT Blood Glucose.: 183 mg/dL (13 Jul 2024 21:20)  POCT Blood Glucose.: 240 mg/dL (13 Jul 2024 16:41)      07-14    137  |  99  |  16  ----------------------------<  255<H>  3.5   |  25  |  1.06    eGFR: 84    Ca    9.0      07-14  Mg     2.10     07-14  Phos  1.9     07-14    TPro  7.4  /  Alb  3.9  /  TBili  0.4  /  DBili  x   /  AST  16  /  ALT  21  /  AlkPhos  78  07-13      A1C with Estimated Average Glucose Result: 11.3 % (07-13-24 @ 06:43)      Thyroid Function Tests:

## 2024-07-14 NOTE — DISCHARGE NOTE NURSING/CASE MANAGEMENT/SOCIAL WORK - NSDCPNINST_GEN_ALL_CORE
Please be sure to schedule & attend all follow-up appointments. Continue to take your medications as prescribed to you by your doctor. Do not operate any machinery or drive any vehicles whilst taking opioid narcotics. If you start to experience a medical emergency please call 9-1-1 or return to the emergency room.

## 2024-07-14 NOTE — PROGRESS NOTE ADULT - ASSESSMENT
Refill authorized per protocol.    
53M with PMH of CAD, HLD, HTN, and uncontrolled DM is POD1 s/p full mouth extraction and placement of 8 dental implants healing without signs or symptoms of infection with poorly controlled DM and updated medication recommendations from endocrinology     Plan:   - Continue to monitor blood glucose  - multinodal pain management  - Ambulate as tolerated  - Possible discharge today if blood glucose stabilized  - F/U in [facility] [place] [facility]    Freddy Wells  Oral and Maxillofacial Surgery  [facility] Pager #[phone]  [facility] Pager: [phone]  [facility] Pager: [phone]  Available on Teams
53M with PMH of CAD, HLD, HTN, and uncontrolled DM is POD1 s/p full mouth extraction and placement of 8 dental implants healing well. Pt has an HbA1C>11 and glucose>11 and requires consult with endocrine before discharge    Plan:   - Consult endocrine regarding HbA1c and gluc  - discussed with pt the association between diabetes and implant failure  - continue to monitor HbA1c.   - multinodal pain management
53M Type 2 DM, CAD, HTN, HLD POD 1 s/p full mouth extractions with 8 dental implants noted with hyperglycemia to 300s.    #Poorly controlled T2DM with hyperglycemia  HbA1c 11.3%  Patient's home regimen is Lantus pen 20 units qAM although misses taking it 2-3 x per week due to forgetting when he gets home from work in AM.  Jardiance 25mg daily  Used to be on metformin but not sure what happened may have run out.  - Endocrinologist: none, sees a pcp Dr. Peck in [place]  PLAN  - Hold oral DM agents while inpatient  - If staying will increase Lantus to 35 units at bedtime. DO NOT HOLD IF NPO.  - Continue Admelog 6 units TID pre-meal. HOLD IF NPO.  - Use low dose Admelog correction scale pre-meal  - Use low dose Admelog correction scale at bedtime  - Fingerstick BG before meals and bedtime  - Goal -180  - please change to Carbohydrate consistent diet  - RD consult    Discharge plan:  - Discharge medications:   -Increase Lantus dose to 35 units once daily - patient normally takes in AM can resume tomorrow AM. Reinforced importance of taking this every day at home. Would provide script for Lantus solostar pen as he is now on a higher dose and will need a larger supply of insulin provided by the pharmacy. Ensure patient has an adequate amount of pen needles at home, if not can prescribe BD edward pen needles use once daily.  -Resume Jardiance 25mg daily  -add back metformin. For metformin since he has been off for some time will start with lower dose metformin 500mg BID with meals x 1 week then increase to 1000mg BID with meals ongoing.  - Patient to call doctor with persistent high or low BG at home.   - Ensure patient has glucometer, test strips and lancets on discharge.  -Patient to obtain endocrinology referral from his pcp in [place] and follow with Dr. Peck in the meanwhile.  -Discussed CGM patient tried it before and declines.  -Ok for discharge from endocrinology standpoint.    #HTN  - Home regimen: lisinopril-HCT and nifedipine  PLAN  - Can check urine albumin/creatinine outpatient  - Outpatient goal BP <130/80. Management per primary team.  -Please resume home BP meds being held in hospital    #HLD  - outpatient lipid profile, may benefit from statin    Discussed with primary team.    Anders Ayala MD  Division of Endocrinology  Pager: [phone]    If after 6PM or before 9AM, or on weekends/holidays, please call endocrine answering service for assistance ([phone]).  For nonurgent matters email [email or web address] for assistance.

## 2024-07-14 NOTE — DISCHARGE NOTE NURSING/CASE MANAGEMENT/SOCIAL WORK - PATIENT PORTAL LINK FT
You can access the FollowMyHealth Patient Portal offered by [facility] by registering at the following website: [email or web address]. By joining [employer]’s FollowMyHealth portal, you will also be able to view your health information using other applications (apps) compatible with our system.

## 2024-07-15 LAB — GLUCOSE BLDC GLUCOMTR-MCNC: 201 MG/DL — HIGH (ref 70–99)

## 2024-07-19 ENCOUNTER — APPOINTMENT (OUTPATIENT)
Age: 54
End: 2024-07-19
Payer: COMMERCIAL

## 2024-07-19 PROCEDURE — D0330 PANORAMIC RADIOGRAPHIC IMAGE: CPT

## 2024-07-19 PROCEDURE — 99024 POSTOP FOLLOW-UP VISIT: CPT

## 2024-08-05 ENCOUNTER — APPOINTMENT (OUTPATIENT)
Age: 54
End: 2024-08-05

## 2024-08-05 PROCEDURE — 99024 POSTOP FOLLOW-UP VISIT: CPT

## 2024-08-09 ENCOUNTER — APPOINTMENT (OUTPATIENT)
Age: 54
End: 2024-08-09

## 2024-08-16 ENCOUNTER — APPOINTMENT (OUTPATIENT)
Age: 54
End: 2024-08-16

## 2024-08-23 ENCOUNTER — APPOINTMENT (OUTPATIENT)
Age: 54
End: 2024-08-23
Payer: COMMERCIAL

## 2024-08-23 PROCEDURE — D0230: CPT

## 2024-08-23 PROCEDURE — D0150: CPT

## 2024-08-23 PROCEDURE — D0220: CPT

## 2024-10-21 ENCOUNTER — APPOINTMENT (OUTPATIENT)
Age: 54
End: 2024-10-21

## 2024-10-21 PROCEDURE — D9223: CPT

## 2024-10-21 PROCEDURE — D9222: CPT

## 2024-10-21 PROCEDURE — D6011: CPT | Mod: NC

## 2024-10-28 ENCOUNTER — APPOINTMENT (OUTPATIENT)
Age: 54
End: 2024-10-28

## 2024-11-25 ENCOUNTER — APPOINTMENT (OUTPATIENT)
Age: 54
End: 2024-11-25
Payer: COMMERCIAL

## 2024-11-25 PROCEDURE — 99024 POSTOP FOLLOW-UP VISIT: CPT

## 2025-01-02 ENCOUNTER — APPOINTMENT (OUTPATIENT)
Age: 55
End: 2025-01-02
Payer: SELF-PAY

## 2025-01-02 PROCEDURE — CLD1: CUSTOM

## 2025-01-02 PROCEDURE — CUD1: CUSTOM

## (undated) DEVICE — GLV 8 PROTEXIS (WHITE)

## (undated) DEVICE — SOL IRR POUR NS 0.9% 500ML

## (undated) DEVICE — DRAPE 3/4 SHEET 52X76"

## (undated) DEVICE — LABELS BLANK W PEN

## (undated) DEVICE — PACK DENTAL MINOR

## (undated) DEVICE — PREP BETADINE KIT

## (undated) DEVICE — WARMING BLANKET LOWER ADULT

## (undated) DEVICE — VENODYNE/SCD SLEEVE CALF MEDIUM

## (undated) DEVICE — POSITIONER FOAM EGG CRATE ULNAR 2PCS (PINK)

## (undated) DEVICE — Device

## (undated) DEVICE — SUT CHROMIC 3-0 27" RB-1

## (undated) DEVICE — LIJ-ESU BOVIE MACHINE - ROBOTIC 11483366: Type: DURABLE MEDICAL EQUIPMENT

## (undated) DEVICE — ELCTR GROUNDING PAD ADULT COVIDIEN

## (undated) DEVICE — PREP BETADINE SPONGE STICKS

## (undated) DEVICE — SUT CHROMIC 4-0 27" RB-1